# Patient Record
Sex: MALE | Race: WHITE | Employment: OTHER | URBAN - METROPOLITAN AREA
[De-identification: names, ages, dates, MRNs, and addresses within clinical notes are randomized per-mention and may not be internally consistent; named-entity substitution may affect disease eponyms.]

---

## 2017-01-06 DIAGNOSIS — M54.50 CHRONIC LOW BACK PAIN WITHOUT SCIATICA, UNSPECIFIED BACK PAIN LATERALITY: ICD-10-CM

## 2017-01-06 DIAGNOSIS — G89.29 CHRONIC LOW BACK PAIN WITHOUT SCIATICA, UNSPECIFIED BACK PAIN LATERALITY: ICD-10-CM

## 2017-01-06 RX ORDER — HYDROCODONE BITARTRATE AND ACETAMINOPHEN 10; 325 MG/1; MG/1
1 TABLET ORAL
Qty: 120 TAB | Refills: 0 | Status: SHIPPED | OUTPATIENT
Start: 2017-01-06 | End: 2017-02-02 | Stop reason: ALTCHOICE

## 2017-01-06 RX ORDER — CARISOPRODOL 350 MG/1
TABLET ORAL
Qty: 120 TAB | Refills: 0 | Status: SHIPPED | OUTPATIENT
Start: 2017-01-06 | End: 2017-02-02 | Stop reason: SDUPTHER

## 2017-01-06 NOTE — TELEPHONE ENCOUNTER
Reviewed report generated by the [facility]. Does not demonstrate aberrancies or inconsistencies with regard to the prescribing of controlled medications to this patient by other providers.   Last filled by MAGI on 12/7/16

## 2017-01-06 NOTE — TELEPHONE ENCOUNTER
Patient called for   Requested Prescriptions     Pending Prescriptions Disp Refills    carisoprodol (SOMA) 350 mg tablet 120 Tab 0     Sig: TAKE 1 TABLET BY MOUTH FOUR TIMES DAILY    HYDROcodone-acetaminophen (NORCO)  mg tablet 120 Tab 0     Sig: Take 1 Tab by mouth every six (6) hours as needed for Pain. He would like to  both scripts in hardcopy today if possible.

## 2017-02-02 ENCOUNTER — OFFICE VISIT (OUTPATIENT)
Dept: INTERNAL MEDICINE CLINIC | Age: 72
End: 2017-02-02

## 2017-02-02 VITALS
DIASTOLIC BLOOD PRESSURE: 80 MMHG | SYSTOLIC BLOOD PRESSURE: 114 MMHG | TEMPERATURE: 98.7 F | HEART RATE: 72 BPM | WEIGHT: 197 LBS | HEIGHT: 69 IN | OXYGEN SATURATION: 97 % | BODY MASS INDEX: 29.18 KG/M2

## 2017-02-02 DIAGNOSIS — M89.8X9 BONE PAIN: ICD-10-CM

## 2017-02-02 DIAGNOSIS — E55.9 HYPOVITAMINOSIS D: ICD-10-CM

## 2017-02-02 DIAGNOSIS — G89.29 CHRONIC LOW BACK PAIN WITHOUT SCIATICA, UNSPECIFIED BACK PAIN LATERALITY: ICD-10-CM

## 2017-02-02 DIAGNOSIS — M54.12 CERVICAL RADICULOPATHY: ICD-10-CM

## 2017-02-02 DIAGNOSIS — M79.10 MYALGIA: Primary | ICD-10-CM

## 2017-02-02 DIAGNOSIS — M54.50 CHRONIC LOW BACK PAIN WITHOUT SCIATICA, UNSPECIFIED BACK PAIN LATERALITY: ICD-10-CM

## 2017-02-02 RX ORDER — OXYCODONE AND ACETAMINOPHEN 7.5; 325 MG/1; MG/1
1 TABLET ORAL
Qty: 120 TAB | Refills: 0 | Status: SHIPPED | OUTPATIENT
Start: 2017-02-02 | End: 2017-03-01 | Stop reason: SDUPTHER

## 2017-02-02 RX ORDER — CARISOPRODOL 350 MG/1
TABLET ORAL
Qty: 120 TAB | Refills: 0 | Status: SHIPPED | OUTPATIENT
Start: 2017-02-02 | End: 2017-03-02 | Stop reason: SDUPTHER

## 2017-02-02 RX ORDER — HYDROCODONE BITARTRATE AND ACETAMINOPHEN 10; 325 MG/1; MG/1
1 TABLET ORAL
Qty: 120 TAB | Refills: 0 | Status: CANCELLED | OUTPATIENT
Start: 2017-02-02

## 2017-02-02 NOTE — PROGRESS NOTES
80 y.o. WHITE OR  male who presents for f/u    Since we saw him last, Dr Garret Larsen said he was not a candidate for any surgery. He's been using the cymbalta and norco up to 4 a day  w fair response. He had seen Dr No Hays prior to that. He tried his daughter's percocet and that seemed to work better so he's asking to change to that. He reports that the sx he was attributing to statin are back even though he's not taken crestor since before fall. He's thinking of starting it back.   He is interested in doing more testing to figure out why he has pain in the forearms and long bones in the legs outside of the joints also     Past Medical History   Diagnosis Date    Abuse      h/o alcohol abstinent since 1/13    Asbestosis(501) ([facility] [address] 1997     dx'ed in [place], f/u xrays negative    Bilateral hearing loss     Chronic pain      DIRE score 18 from 5/16    Chronic prostatitis      Dr. Amy Norman COPD 7/11     on CXR    DJD (degenerative joint disease)      c spine on mri 2004, t spine mri 10/13; saw MAURICIO Velazco; intol lyrica, topamax, no response to gabapentin    Epididymitis      recurrent Dr Leocadia Meigs 10/14; also gb polyps, no stones    FHx: heart disease     GERD (gastroesophageal reflux disease)      h/o gastritis; s/p dilation 8/14 Dr Juan Rodríguez    Hyperlipidemia      calculated 10 year risk score 12.6% (2014)    Hypovitaminosis D 7/14    Microscopic hematuria     Pancreatitis      10/14 from binge alcohol drinking; on CT 10/14 Obici    PUD (peptic ulcer disease) 8/14     antral ulcers, gastritis, gr 3 esophagitis, esoph ring s/p dilation, neg h pylori Dr Juan Rodríguez    Recurrent kidney stones      2006 Dr. Wes Davis; 2015     Past Surgical History   Procedure Laterality Date    Hx hemorrhoidectomy  2005     Dr. Zahra Euceda Pr cardiac surg procedure unlist  1993     thallium negative    Hx orthopaedic  2006     left knee surgery/meniscus/patella in [place], [place] workmans comp    Hx cataract removal Bilateral 2012    Hx colonoscopy       Dr. Maggy Hyatt 2005 negative    Hx urological  4/15     [facility], Dr. Rita Lockwood, Cystoscopy, left retrograde ureteroscopy, holmium laser lithotripsy, double j catheter      Current Outpatient Prescriptions   Medication Sig    carisoprodol (SOMA) 350 mg tablet TAKE 1 TABLET BY MOUTH FOUR TIMES DAILY    oxyCODONE-acetaminophen (PERCOCET 7.5) 7.5-325 mg per tablet Take 1 Tab by mouth every six (6) hours as needed for Pain. Max Daily Amount: 4 Tabs.  temazepam (RESTORIL) 30 mg capsule TAKE ONE CAPSULE BY MOUTH NIGHTLY AS NEEDED FOR SLEEP    rosuvastatin (CRESTOR) 5 mg tablet Take 1 Tab by mouth nightly. No current facility-administered medications for this visit. Allergies   Allergen Reactions    Benadryl [Diphenhydramine Hcl] Other (comments)     Patient states he becomes hyper      Flexeril [Cyclobenzaprine] Other (comments)     Felt bad      Gabapentin Swelling    Lipitor [Atorvastatin] Myalgia    Lyrica [Pregabalin] Other (comments)     Weight gain    Nexium [Esomeprazole Magnesium] Other (comments)     Dry eyes    Pamelor [Nortriptyline] Hives    Pravastatin Myalgia    Septra [Sulfamethoprim Ds] Other (comments)     Agitation      Sinequan [Doxepin] Other (comments)     Irritable      Topamax [Topiramate] Other (comments)     Possible kidney stones? Visit Vitals    /80    Pulse 72    Temp 98.7 °F (37.1 °C) (Oral)    Ht 5' 9\" (1.753 m)    Wt 197 lb (89.4 kg)    SpO2 97%    BMI 29.09 kg/m2   A&O x3  Affect is appropriate. Mood stable  No apparent distress  Anicteric, no JVD, adenopathy or thyromegaly. No carotid bruits or radiated murmur  Lungs clear to auscultation, no wheezes or rales  Heart showed regular rate and rhythm. No murmur, rubs, gallops  Abdomen soft nontender, no hepatosplenomegaly or masses. Extremities without edema.   Pulses 1-2+ symmetrically  No clear muscle or tendon tenderness, no bony tenderness, no clear synovitis in hands/wrists/elbows    Assessment and plan:  1. Chronic pain. Check ck/abraham, vit d. Check bone scan. Check emg to r/o radiculopathy. Change to percocet. Further recs pending results      Above conditions discussed at length and patient vocalized understanding.   All questions answered to patient satifaction

## 2017-02-02 NOTE — PROGRESS NOTES
1. Have you been to the ER, urgent care clinic or hospitalized since your last visit? NO.     2. Have you seen or consulted any other health care providers outside of the [address] [place] [address] since your last visit (Include any pap smears or colon screening)? NO      Do you have an Advanced Directive? NO    Would you like information on Advanced Directives?  YES

## 2017-02-02 NOTE — MR AVS SNAPSHOT
Visit Information Date & Time Provider Department Dept. Phone Encounter #  
 2/2/2017  3:00 PM Isabelle Mera MD Internist of [address] [place] [address] Road [phone] [id number] Upcoming Health Maintenance Date Due Hepatitis C Screening 1945 DTaP/Tdap/Td series (1 - Tdap) 1/2/2006 GLAUCOMA SCREENING Q2Y 8/4/2011 INFLUENZA AGE 9 TO ADULT 8/1/2016 FOBT Q 1 YEAR AGE 50-75 4/4/2017 MEDICARE YEARLY EXAM 4/5/2017 Pneumococcal 65+ Low/Medium Risk (2 of 2 - PPSV23) 5/27/2017 Allergies as of 2/2/2017  Review Complete On: 8/2/2016 By: Isabelle Mera MD  
  
 Severity Noted Reaction Type Reactions Benadryl [Diphenhydramine Hcl]  04/23/2015    Other (comments) Patient states he becomes hyper Flexeril [Cyclobenzaprine]  10/04/2013    Other (comments) Felt bad  
 Gabapentin  01/13/2014   Systemic Swelling Lipitor [Atorvastatin]  04/04/2016    Myalgia Lyrica [Pregabalin]  03/16/2015    Other (comments) Weight gain Nexium [Esomeprazole Magnesium]    Other (comments) Dry eyes Pamelor [Nortriptyline]    Hives Pravastatin  05/01/2016    Myalgia Septra [Sulfamethoprim Ds]    Other (comments) Agitation Sinequan [Doxepin]    Other (comments) Irritable Topamax [Topiramate]  11/12/2015    Other (comments) Possible kidney stones? Current Immunizations  Reviewed on 5/27/2016 Name Date Influenza Vaccine 10/1/2015, 10/1/2014, 11/1/2013, 10/1/2007 Pneumococcal Conjugate (PCV-13) 5/27/2016  9:36 AM  
 Td 1/1/2006 Zoster Vaccine, Live 4/4/2016 Not reviewed this visit You Were Diagnosed With   
  
 Codes Comments Myalgia    -  Primary ICD-10-CM: M79.1 ICD-9-CM: 729.1 Chronic low back pain without sciatica, unspecified back pain laterality     ICD-10-CM: M54.5, G89.29 ICD-9-CM: 724.2, 338.29 Hypovitaminosis D     ICD-10-CM: E55.9 ICD-9-CM: 268.9 Vitals BP Pulse Temp Height(growth percentile) Weight(growth percentile) SpO2  
 114/80 72 98.7 °F (37.1 °C) (Oral) 5' 9\" (1.753 m) 197 lb (89.4 kg) 97% BMI Smoking Status 29.09 kg/m2 Former Smoker Vitals History BMI and BSA Data Body Mass Index Body Surface Area  
 29.09 kg/m 2 2.09 m 2 [facility] Your Updated Medication List  
  
   
This list is accurate as of: 2/2/17  3:39 PM.  Always use your most recent med list.  
  
  
  
  
 carisoprodol 350 mg tablet Commonly known as:  SOMA TAKE 1 TABLET BY MOUTH FOUR TIMES DAILY  
  
 oxyCODONE-acetaminophen 7.5-325 mg per tablet Commonly known as:  PERCOCET 7.5 Take 1 Tab by mouth every six (6) hours as needed for Pain. Max Daily Amount: 4 Tabs. rosuvastatin 5 mg tablet Commonly known as:  CRESTOR Take 1 Tab by mouth nightly. temazepam 30 mg capsule Commonly known as:  RESTORIL  
TAKE ONE CAPSULE BY MOUTH NIGHTLY AS NEEDED FOR SLEEP Prescriptions Printed Refills  
 carisoprodol (SOMA) 350 mg tablet 0 Sig: TAKE 1 TABLET BY MOUTH FOUR TIMES DAILY Class: Print  
 oxyCODONE-acetaminophen (PERCOCET 7.5) 7.5-325 mg per tablet 0 Sig: Take 1 Tab by mouth every six (6) hours as needed for Pain. Max Daily Amount: 4 Tabs. Class: Print Route: Oral  
  
We Performed the Following ALDOLASE [id number] CPT(R)] CK [id number] CPT(R)] METABOLIC PANEL, COMPREHENSIVE [02205 CPT(R)] VITAMIN D, 25 HYDROXY [id number] CPT(R)] To-Do List   
 02/02/2017 Lab:  ALDOLASE   
  
 02/02/2017 Lab:  CK   
  
 02/02/2017 Lab:  METABOLIC PANEL, COMPREHENSIVE   
  
 02/02/2017 Lab:  VITAMIN D, 25 HYDROXY Introducing \[facility] [place] [facility]\"" & HEALTH SERVICES! Dear Raiza Fabian: Thank you for requesting a Energy account.   Our records indicate that you already have an active Qomuty account. You can access your account anytime at [email or web address] Did you know that you can access your hospital and ER discharge instructions at any time in Qomuty? You can also review all of your test results from your hospital stay or ER visit. Additional Information If you have questions, please visit the Frequently Asked Questions section of the Qomuty website at [email or web address] Remember, Qomuty is NOT to be used for urgent needs. For medical emergencies, dial 911. Now available from your iPhone and Android! Please provide this summary of care documentation to your next provider. Your primary care clinician is listed as Loy Nguyen. If you have any questions after today's visit, please call [phone].

## 2017-02-03 LAB
25(OH)D3 SERPL-MCNC: 31.9 NG/ML (ref 32–100)
A-G RATIO,AGRAT: 1.8 RATIO (ref 1.1–2.6)
ALBUMIN SERPL-MCNC: 4.6 G/DL (ref 3.5–5)
ALP SERPL-CCNC: 91 U/L (ref 40–125)
ALT SERPL-CCNC: 26 U/L (ref 5–40)
ANION GAP SERPL CALC-SCNC: 19 MMOL/L
AST SERPL W P-5'-P-CCNC: 20 U/L (ref 10–37)
BILIRUB SERPL-MCNC: 0.3 MG/DL (ref 0.2–1.2)
BUN SERPL-MCNC: 20 MG/DL (ref 6–22)
CALCIUM SERPL-MCNC: 9.4 MG/DL (ref 8.4–10.4)
CHLORIDE SERPL-SCNC: 97 MMOL/L (ref 98–110)
CK SERPL-CCNC: 183 U/L (ref 30–200)
CO2 SERPL-SCNC: 23 MMOL/L (ref 20–32)
CREAT SERPL-MCNC: 0.7 MG/DL (ref 0.8–1.6)
GFRAA, 66117: >60
GFRNA, 66118: >60
GLOBULIN,GLOB: 2.6 G/DL (ref 2–4)
GLUCOSE SERPL-MCNC: 103 MG/DL (ref 65–99)
POTASSIUM SERPL-SCNC: 5 MMOL/L (ref 3.5–5.5)
PROT SERPL-MCNC: 7.2 G/DL (ref 6.2–8.1)
SODIUM SERPL-SCNC: 139 MMOL/L (ref 133–145)

## 2017-02-04 LAB — ALDOLASE, 002030: 4.2 U/L

## 2017-02-13 ENCOUNTER — TELEPHONE (OUTPATIENT)
Dept: INTERNAL MEDICINE CLINIC | Age: 72
End: 2017-02-13

## 2017-02-13 DIAGNOSIS — R52 ACHES: Primary | ICD-10-CM

## 2017-02-13 NOTE — TELEPHONE ENCOUNTER
pls call    Results for orders placed or performed in visit on 02/02/17   ALDOLASE   Result Value Ref Range    Aldolase 4.2 3.3 - 10.3 U/L   CK   Result Value Ref Range    Creatine Kinase,Total 183 30 - 200 U/L   VITAMIN D, 25 HYDROXY   Result Value Ref Range    VITAMIN D, 25-HYDROXY 31.9 (L) 32.0 - 612.8 ng/mL   METABOLIC PANEL, COMPREHENSIVE   Result Value Ref Range    Glucose 103 (H) 65 - 99 mg/dL    BUN 20 6 - 22 mg/dL    Creatinine 0.7 (L) 0.8 - 1.6 mg/dL    Sodium 139 133 - 145 mmol/L    Potassium 5.0 3.5 - 5.5 mmol/L    Chloride 97 (L) 98 - 110 mmol/L    CO2 23 20 - 32 mmol/L    AST (SGOT) 20 10 - 37 U/L    ALT (SGPT) 26 5 - 40 U/L    Alk.  phosphatase 91 40 - 125 U/L    Bilirubin, total 0.3 0.2 - 1.2 mg/dL    Calcium 9.4 8.4 - 10.4 mg/dL    Protein, total 7.2 6.2 - 8.1 g/dL    Albumin 4.6 3.5 - 5.0 g/dL    A-G Ratio 1.8 1.1 - 2.6 ratio    Globulin 2.6 2.0 - 4.0 g/dL    Anion gap 19.0 mmol/L    GFRAA >60.0 >60.0    GFRNA >60.0 >60.0     Labs ok  sched for sed rate and c reactive protein test looking for inflammation pls

## 2017-03-01 DIAGNOSIS — M54.50 CHRONIC LOW BACK PAIN WITHOUT SCIATICA, UNSPECIFIED BACK PAIN LATERALITY: ICD-10-CM

## 2017-03-01 DIAGNOSIS — G89.29 CHRONIC LOW BACK PAIN WITHOUT SCIATICA, UNSPECIFIED BACK PAIN LATERALITY: ICD-10-CM

## 2017-03-01 RX ORDER — OXYCODONE AND ACETAMINOPHEN 7.5; 325 MG/1; MG/1
1 TABLET ORAL
Qty: 120 TAB | Refills: 0 | Status: SHIPPED | OUTPATIENT
Start: 2017-03-01 | End: 2017-03-31 | Stop reason: ALTCHOICE

## 2017-03-02 DIAGNOSIS — M54.50 CHRONIC LOW BACK PAIN WITHOUT SCIATICA, UNSPECIFIED BACK PAIN LATERALITY: ICD-10-CM

## 2017-03-02 DIAGNOSIS — G89.29 CHRONIC LOW BACK PAIN WITHOUT SCIATICA, UNSPECIFIED BACK PAIN LATERALITY: ICD-10-CM

## 2017-03-02 RX ORDER — CARISOPRODOL 350 MG/1
TABLET ORAL
Qty: 120 TAB | Refills: 0 | Status: SHIPPED | OUTPATIENT
Start: 2017-03-02 | End: 2017-03-29 | Stop reason: SDUPTHER

## 2017-03-29 DIAGNOSIS — G89.29 CHRONIC LOW BACK PAIN WITHOUT SCIATICA, UNSPECIFIED BACK PAIN LATERALITY: ICD-10-CM

## 2017-03-29 DIAGNOSIS — M54.50 CHRONIC LOW BACK PAIN WITHOUT SCIATICA, UNSPECIFIED BACK PAIN LATERALITY: ICD-10-CM

## 2017-03-30 RX ORDER — CARISOPRODOL 350 MG/1
TABLET ORAL
Qty: 120 TAB | Refills: 0 | OUTPATIENT
Start: 2017-03-30 | End: 2017-03-31 | Stop reason: SDUPTHER

## 2017-03-30 RX ORDER — CARISOPRODOL 350 MG/1
TABLET ORAL
Qty: 120 TAB | Refills: 0 | Status: CANCELLED | OUTPATIENT
Start: 2017-03-30

## 2017-03-31 ENCOUNTER — OFFICE VISIT (OUTPATIENT)
Dept: INTERNAL MEDICINE CLINIC | Age: 72
End: 2017-03-31

## 2017-03-31 VITALS
TEMPERATURE: 97.8 F | BODY MASS INDEX: 28.73 KG/M2 | HEART RATE: 78 BPM | OXYGEN SATURATION: 96 % | SYSTOLIC BLOOD PRESSURE: 132 MMHG | DIASTOLIC BLOOD PRESSURE: 72 MMHG | HEIGHT: 69 IN | WEIGHT: 194 LBS

## 2017-03-31 DIAGNOSIS — M79.7 FIBROMYALGIA: ICD-10-CM

## 2017-03-31 DIAGNOSIS — Z12.11 ENCOUNTER FOR SCREENING COLONOSCOPY: ICD-10-CM

## 2017-03-31 DIAGNOSIS — M54.2 CHRONIC NECK PAIN: ICD-10-CM

## 2017-03-31 DIAGNOSIS — K21.9 GASTROESOPHAGEAL REFLUX DISEASE WITHOUT ESOPHAGITIS: ICD-10-CM

## 2017-03-31 DIAGNOSIS — M35.3 POLYMYALGIA (HCC): Primary | ICD-10-CM

## 2017-03-31 DIAGNOSIS — M15.9 PRIMARY OSTEOARTHRITIS INVOLVING MULTIPLE JOINTS: ICD-10-CM

## 2017-03-31 DIAGNOSIS — M54.50 CHRONIC LOW BACK PAIN WITHOUT SCIATICA, UNSPECIFIED BACK PAIN LATERALITY: ICD-10-CM

## 2017-03-31 DIAGNOSIS — G89.29 CHRONIC NECK PAIN: ICD-10-CM

## 2017-03-31 DIAGNOSIS — G89.29 CHRONIC LOW BACK PAIN WITHOUT SCIATICA, UNSPECIFIED BACK PAIN LATERALITY: ICD-10-CM

## 2017-03-31 DIAGNOSIS — E78.5 HYPERLIPIDEMIA, UNSPECIFIED HYPERLIPIDEMIA TYPE: ICD-10-CM

## 2017-03-31 RX ORDER — CARISOPRODOL 350 MG/1
TABLET ORAL
Qty: 120 TAB | Refills: 0 | Status: SHIPPED | OUTPATIENT
Start: 2017-03-31 | End: 2017-04-28 | Stop reason: SDUPTHER

## 2017-03-31 RX ORDER — HYDROCODONE BITARTRATE AND ACETAMINOPHEN 10; 325 MG/1; MG/1
1 TABLET ORAL
Qty: 120 TAB | Refills: 0 | Status: SHIPPED | OUTPATIENT
Start: 2017-03-31 | End: 2017-04-28 | Stop reason: SDUPTHER

## 2017-03-31 NOTE — MR AVS SNAPSHOT
Visit Information Date & Time Provider Department Dept. Phone Encounter #  
 3/31/2017  9:45 AM Shefali Workman MD Internist of [address] [place] [address] Road [phone] [id number] Upcoming Health Maintenance Date Due Hepatitis C Screening 1945 DTaP/Tdap/Td series (1 - Tdap) 1/2/2006 GLAUCOMA SCREENING Q2Y 8/4/2011 FOBT Q 1 YEAR AGE 50-75 4/4/2017 MEDICARE YEARLY EXAM 4/5/2017 Pneumococcal 65+ Low/Medium Risk (2 of 2 - PPSV23) 5/27/2017 Allergies as of 3/31/2017  Review Complete On: 3/31/2017 By: Shefali Workman MD  
  
 Severity Noted Reaction Type Reactions Benadryl [Diphenhydramine Hcl]  04/23/2015    Other (comments) Patient states he becomes hyper Flexeril [Cyclobenzaprine]  10/04/2013    Other (comments) Felt bad  
 Gabapentin  01/13/2014   Systemic Swelling Lipitor [Atorvastatin]  04/04/2016    Myalgia Lyrica [Pregabalin]  03/16/2015    Other (comments) Weight gain Nexium [Esomeprazole Magnesium]    Other (comments) Dry eyes Pamelor [Nortriptyline]    Hives Pravastatin  05/01/2016    Myalgia Septra [Sulfamethoprim Ds]    Other (comments) Agitation Sinequan [Doxepin]    Other (comments) Irritable Topamax [Topiramate]  11/12/2015    Other (comments) Possible kidney stones? Current Immunizations  Reviewed on 5/27/2016 Name Date Influenza Vaccine 10/1/2015, 10/1/2014, 11/1/2013, 10/1/2007 Pneumococcal Conjugate (PCV-13) 5/27/2016  9:36 AM  
 Td 1/1/2006 Zoster Vaccine, Live 4/4/2016 Not reviewed this visit You Were Diagnosed With   
  
 Codes Comments Polymyalgia ([facility] [address]    -  Primary ICD-10-CM: M35.3 ICD-9-CM: 649 Fibromyalgia     ICD-10-CM: M79.7 ICD-9-CM: 729.1 Chronic low back pain without sciatica, unspecified back pain laterality     ICD-10-CM: M54.5, G89.29 ICD-9-CM: 724.2, 338.29 Vitals BP Pulse Temp Height(growth percentile) Weight(growth percentile) SpO2 132/72 78 97.8 °F (36.6 °C) (Oral) 5' 9\" (1.753 m) 194 lb (88 kg) 96% BMI Smoking Status 28.65 kg/m2 Former Smoker Vitals History BMI and BSA Data Body Mass Index Body Surface Area  
 28.65 kg/m 2 2.07 m 2 Preferred Pharmacy Pharm[facility] Your Updated Medication List  
  
   
This list is accurate as of: 3/31/17 10:35 AM.  Always use your most recent med list.  
  
  
  
  
 carisoprodol 350 mg tablet Commonly known as:  SOMA TAKE 1 TABLET BY MOUTH FOUR TIMES DAILY HYDROcodone-acetaminophen  mg tablet Commonly known as:  Lyn Parisian Take 1 Tab by mouth every six (6) hours as needed for Pain. Max Daily Amount: 4 Tabs. rosuvastatin 5 mg tablet Commonly known as:  CRESTOR Take 1 Tab by mouth nightly. temazepam 30 mg capsule Commonly known as:  RESTORIL  
TAKE ONE CAPSULE BY MOUTH NIGHTLY AS NEEDED FOR SLEEP Prescriptions Printed Refills  
 carisoprodol (SOMA) 350 mg tablet 0 Sig: TAKE 1 TABLET BY MOUTH FOUR TIMES DAILY Class: Print HYDROcodone-acetaminophen (NORCO)  mg tablet 0 Sig: Take 1 Tab by mouth every six (6) hours as needed for Pain. Max Daily Amount: 4 Tabs. Class: Print Route: Oral  
  
To-Do List   
 03/31/2017 Lab:  CRP, HIGH SENSITIVITY   
  
 03/31/2017 Lab:  SED RATE (ESR) Introducing [facility]! Dear Isrrael Jennings: Thank you for requesting a Canary Calendar account. Our records indicate that you already have an active Canary Calendar account. You can access your account anytime at [email or web address] Did you know that you can access your hospital and ER discharge instructions at any time in Canary Calendar? You can also review all of your test results from your hospital stay or ER visit. Additional Information If you have questions, please visit the Frequently Asked Questions section of the Myriohart website at [email or web address] Remember, Smash Bucket is NOT to be used for urgent needs. For medical emergencies, dial 911. Now available from your iPhone and Android! Please provide this summary of care documentation to your next provider. Your primary care clinician is listed as Chang Burns. If you have any questions after today's visit, please call [phone].

## 2017-04-01 LAB
C-REACTIVE PROTEIN, CARDIAC, 120768: 14.82 MG/L
C-REACTIVE PROTEIN, CARDIAC, 120768: 14.82 MG/L
SED RATE (ESR): 5 MM/HR (ref 0–20)

## 2017-04-01 NOTE — PROGRESS NOTES
80 y.o. WHITE OR  male who presents for evaluation. He's back asking to get back on norco.  The percocet works better for pain control but he doesn't like how it makes him feel. We have tried several non narcotic products without god response. He has seen Dr Gaye Ramirez who said he was not a surgical candidate and opinion from Dr Bri Tapia concurred.  He is trying to limit to 4 tabs a day but it's just difficult    He is on crestor and has not noted any worsening on that    No cardiovascular complaints or gi issues    Past Medical History:   Diagnosis Date    Abuse     h/o alcohol abstinent since 1/13    Asbestosis(501) ([facility] [address] 1997    dx'ed in [place], f/u xrays negative    Bilateral hearing loss     Chronic pain     DIRE score 18 from 5/16; no response gabapentin/lyrica/topamax/cymbalta; controlled substance agreement in place    Chronic prostatitis     Dr. Luciano Breen COPD 7/11    on CXR    DJD (degenerative joint disease)     c spine on mri 2004, t spine mri 10/13; saw Dr Gaye Ramirez; intol lyrica, topamax and cymbalta; no response to gabapentin    Epididymitis     recurrent Dr Juan Pablo Reid 10/14; also gb polyps, no stones    FHx: heart disease     GERD (gastroesophageal reflux disease)     h/o gastritis; s/p dilation 8/14 Dr Dina Kelly    Hyperlipidemia     calculated 10 year risk score 12.6% (2014)    Hypovitaminosis D 7/14    Microscopic hematuria     Pancreatitis     10/14 from binge alcohol drinking; on CT 10/14 Obici    PUD (peptic ulcer disease) 8/14    antral ulcers, gastritis, gr 3 esophagitis, esoph ring s/p dilation, neg h pylori Dr Dina Kelly    Recurrent kidney stones     2006 Dr. Hillary Rinne; 2015     Past Surgical History:   Procedure Laterality Date    CARDIAC SURG PROCEDURE UNLIST  1993    thallium negative    HX CATARACT REMOVAL Bilateral 2012    HX COLONOSCOPY      Dr. Lisa Lui 2005 negative    HX HEMORRHOIDECTOMY  2005    Dr. Moira William  2006    left knee surgery/meniscus/patella in [place], [place] workmans comp    HX UROLOGICAL  4/15    [facility], Dr. Trev Gifford, Cystoscopy, left retrograde ureteroscopy, holmium laser lithotripsy, double j catheter      Social History     Social History    Marital status:      Spouse name: N/A    Number of children: 10    Years of education: N/A     Occupational History     [employer]      Social History Main Topics    Smoking status: Former Smoker     Packs/day: 1.00     Quit date: 1/1/2011    Smokeless tobacco: Former User    Alcohol use Yes      Comment: socially    Drug use: No    Sexual activity: Not on file     Other Topics Concern    Not on file     Social History Narrative     Current Outpatient Prescriptions   Medication Sig    carisoprodol (SOMA) 350 mg tablet TAKE 1 TABLET BY MOUTH FOUR TIMES DAILY    HYDROcodone-acetaminophen (NORCO)  mg tablet Take 1 Tab by mouth every six (6) hours as needed for Pain. Max Daily Amount: 4 Tabs.  temazepam (RESTORIL) 30 mg capsule TAKE ONE CAPSULE BY MOUTH NIGHTLY AS NEEDED FOR SLEEP    rosuvastatin (CRESTOR) 5 mg tablet Take 1 Tab by mouth nightly. No current facility-administered medications for this visit. Allergies   Allergen Reactions    Benadryl [Diphenhydramine Hcl] Other (comments)     Patient states he becomes hyper      Cymbalta [Duloxetine] Other (comments)    Flexeril [Cyclobenzaprine] Other (comments)     Felt bad      Gabapentin Swelling    Lipitor [Atorvastatin] Myalgia    Lyrica [Pregabalin] Other (comments)     Weight gain    Nexium [Esomeprazole Magnesium] Other (comments)     Dry eyes    Pamelor [Nortriptyline] Hives    Pravastatin Myalgia    Septra [Sulfamethoprim Ds] Other (comments)     Agitation      Sinequan [Doxepin] Other (comments)     Irritable      Topamax [Topiramate] Other (comments)     Possible kidney stones?      REVIEW OF SYSTEMS: colo Dr Joslyn Ormond 2005  Ophtho - no vision change or eye pain  Oral - no mouth pain, tongue or tooth problems  Ears - no hearing loss, ear pain, fullness, no swallowing problems  Cardiac - no CP, PND, orthopnea, edema, palpitations or syncope  Resp - no wheezing, chronic coughing, dyspnea  GI - no heartburn, nausea, vomiting, change in bowel habits, bleeding, hemorrhoids  Urinary - no dysuria, hematuria, flank pain, urgency, frequency  Genitals - no genital lesions, discharge, masses, ulceration, warts    Visit Vitals    /72    Pulse 78    Temp 97.8 °F (36.6 °C) (Oral)    Ht 5' 9\" (1.753 m)    Wt 194 lb (88 kg)    SpO2 96%    BMI 28.65 kg/m2   A&O x3  Affect is appropriate. Mood stable  No apparent distress  Anicteric, no JVD, adenopathy or thyromegaly. No carotid bruits or radiated murmur  Lungs clear to auscultation, no wheezes or rales  Heart showed regular rate and rhythm. No murmur, rubs, gallops  Abdomen soft nontender, no hepatosplenomegaly or masses. Extremities without edema.   Pulses 1-2+ symmetrically    LABS  From 7/11 showed   gluc 99,   cr 0.90, gfr>60,                  wbc 5.2, hb 14.1, plt 225  From 7/14 showed   gluc 92,   cr 0.57, gfr 106, alt 31,   chol 274, tg 186, hdl 68, ldl-c 169, wbc 3.9, hb 14.4, plt 241, ua neg, vit d 21.8, tsh 1.46  From 10/14 showed gluc 92,   cr 0.50, gfr>60,  alt 53,                 wbc 7.3, hb 12.4, plt 147, lip 126, ast 69  From 1/15 showed   gluc 99,   cr 0.60, gfr>60,  alt 102, chol 196, tg 187, hdl 60, ldl-c 98,            ast 47, ap 96, tb 0.3  From 3/15 showed   gluc 117, cr 1.30, gfr 53,   alt 36  From 4/15 showed   gluc 124, cr 1.06, gfr>60,  alt 41,      wbc 5.3, hb 15.3, plt 264  From 2/16 showed                   vit d 32.5, ck 113, abraham 8.6  From 2/17 showed   gluc 103, cr 0.70, gfr>60,  alt 26    Patient Active Problem List   Diagnosis Code    GERD and gastritis K21.9    Hyperlipidemia E78.5    Recurrent kidney stones Dr Franny Marte N20.0    Hypovitaminosis D E55.9    Chronic back pain Dr Romy Smalls M54.9, A02.05   [facility] Arthritis, degenerative M19.90    Chronic neck pain M54.2, G89.29     Assessment and plan:  1. Chronic pain. Will sched w pain mgmt. Long discussion about controlled substance agreement, all questions answered and he signed it. Check esr and crp for completeness  2. GERD. PPI and avoidance measures prn  3. Hyperlipidemia. Continue current   4. Nephrolithiasis. F/U Dr Dahiana Mays  5. Hypovit d.  continue replacement  6. Gastritis and PUD. PPI and avoidance measures, no nsaid  7. Prostate. F/U Dr Dahiana Mays        RTC 6/17    Above conditions discussed at length and patient vocalized understanding.   All questions answered to patient satifaction

## 2017-04-01 NOTE — PROGRESS NOTES
pls get records dr Alison Graf    Call pt to sched ov w labs in June- front staff    Call pt to sched colo as he's due - nurse

## 2017-04-04 NOTE — PROGRESS NOTES
Referral was faxed to [facility] today and they will contact pt to schedule colonoscopy.

## 2017-04-17 ENCOUNTER — OFFICE VISIT (OUTPATIENT)
Dept: INTERNAL MEDICINE CLINIC | Age: 72
End: 2017-04-17

## 2017-04-17 VITALS
HEIGHT: 69 IN | SYSTOLIC BLOOD PRESSURE: 138 MMHG | HEART RATE: 80 BPM | OXYGEN SATURATION: 96 % | DIASTOLIC BLOOD PRESSURE: 80 MMHG | BODY MASS INDEX: 27.99 KG/M2 | WEIGHT: 189 LBS | TEMPERATURE: 98.1 F

## 2017-04-17 DIAGNOSIS — M54.2 CHRONIC NECK PAIN: Primary | ICD-10-CM

## 2017-04-17 DIAGNOSIS — G89.29 CHRONIC NECK PAIN: Primary | ICD-10-CM

## 2017-04-17 DIAGNOSIS — G89.29 CHRONIC LOW BACK PAIN WITH SCIATICA, SCIATICA LATERALITY UNSPECIFIED, UNSPECIFIED BACK PAIN LATERALITY: ICD-10-CM

## 2017-04-17 DIAGNOSIS — M54.40 CHRONIC LOW BACK PAIN WITH SCIATICA, SCIATICA LATERALITY UNSPECIFIED, UNSPECIFIED BACK PAIN LATERALITY: ICD-10-CM

## 2017-04-17 NOTE — PROGRESS NOTES
1. Have you been to the ER, urgent care clinic or hospitalized since your last visit? NO.     2. Have you seen or consulted any other health care providers outside of the [address] [place] [address] since your last visit (Include any pap smears or colon screening)? NO      Do you have an Advanced Directive? NO    Would you like information on Advanced Directives?  YES

## 2017-04-17 NOTE — MR AVS SNAPSHOT
Visit Information Date & Time Provider Department Dept. Phone Encounter #  
 4/17/2017 12:15 PM Karin Alpers, MD Internist of [address] [id number] Upcoming Health Maintenance Date Due Hepatitis C Screening 1945 FOBT Q 1 YEAR AGE 50-75 4/4/2017 MEDICARE YEARLY EXAM 4/5/2017 Pneumococcal 65+ Low/Medium Risk (2 of 2 - PPSV23) 5/27/2017 GLAUCOMA SCREENING Q2Y 3/31/2019 DTaP/Tdap/Td series (2 - Td) 3/31/2027 Allergies as of 4/17/2017  Review Complete On: 3/31/2017 By: Karin Alpers, MD  
  
 Severity Noted Reaction Type Reactions Benadryl [Diphenhydramine Hcl]  04/23/2015    Other (comments) Patient states he becomes hyper Cymbalta [Duloxetine]  03/31/2017    Other (comments) Flexeril [Cyclobenzaprine]  10/04/2013    Other (comments) Felt bad  
 Gabapentin  01/13/2014   Systemic Swelling Lipitor [Atorvastatin]  04/04/2016    Myalgia Lyrica [Pregabalin]  03/16/2015    Other (comments) Weight gain Nexium [Esomeprazole Magnesium]    Other (comments) Dry eyes Pamelor [Nortriptyline]    Hives Pravastatin  05/01/2016    Myalgia Septra [Sulfamethoprim Ds]    Other (comments) Agitation Sinequan [Doxepin]    Other (comments) Irritable Topamax [Topiramate]  11/12/2015    Other (comments) Possible kidney stones? Current Immunizations  Reviewed on 5/27/2016 Name Date Influenza Vaccine 10/1/2015, 10/1/2014, 11/1/2013, 10/1/2007 Pneumococcal Conjugate (PCV-13) 5/27/2016  9:36 AM  
 Td 1/1/2006 Zoster Vaccine, Live 4/4/2016 Not reviewed this visit Vitals BP Pulse Temp Height(growth percentile) Weight(growth percentile) SpO2  
 138/80 80 98.1 °F (36.7 °C) (Oral) 5' 9\" (1.753 m) 189 lb (85.7 kg) 96% BMI Smoking Status 27.91 kg/m2 Former Smoker Vitals History BMI and BSA Data  Body Mass Index Body Surface Area  
 27.91 kg/m 2 2.04 m 2  
  
  
 Preferred Pharmacy Pharmacy Name Phone Teddy Villalobos [facility] [facility] Your Updated Medication List  
  
   
This list is accurate as of: 4/17/17  1:10 PM.  Always use your most recent med list.  
  
  
  
  
 carisoprodol 350 mg tablet Commonly known as:  SOMA TAKE 1 TABLET BY MOUTH FOUR TIMES DAILY HYDROcodone-acetaminophen  mg tablet Commonly known as:  Fredick Sly Take 1 Tab by mouth every six (6) hours as needed for Pain. Max Daily Amount: 4 Tabs. rosuvastatin 5 mg tablet Commonly known as:  CRESTOR Take 1 Tab by mouth nightly. temazepam 30 mg capsule Commonly known as:  RESTORIL  
TAKE ONE CAPSULE BY MOUTH NIGHTLY AS NEEDED FOR SLEEP Introducing [facility] SERVICES! Dear Raúl Lyle: Thank you for requesting a [employer] account. Our records indicate that you already have an active [employer] account. You can access your account anytime at [email or web address] Did you know that you can access your hospital and ER discharge instructions at any time in [employer] You can also review all of your test results from your hospital stay or ER visit. Additional Information If you have questions, please visit the Frequently Asked Questions section of the [employer] website at [email or web address] Remember, [employer] is NOT to be used for urgent needs. For medical emergencies, dial 911. Now available from your iPhone and Android! Please provide this summary of care documentation to your next provider. Your primary care clinician is listed as Nolberto Long. If you have any questions after today's visit, please call [phone].

## 2017-04-28 DIAGNOSIS — G89.29 CHRONIC LOW BACK PAIN WITHOUT SCIATICA, UNSPECIFIED BACK PAIN LATERALITY: ICD-10-CM

## 2017-04-28 DIAGNOSIS — M54.50 CHRONIC LOW BACK PAIN WITHOUT SCIATICA, UNSPECIFIED BACK PAIN LATERALITY: ICD-10-CM

## 2017-04-28 RX ORDER — CARISOPRODOL 350 MG/1
TABLET ORAL
Qty: 120 TAB | Refills: 0 | Status: SHIPPED | OUTPATIENT
Start: 2017-04-28 | End: 2017-05-30 | Stop reason: SDUPTHER

## 2017-04-28 RX ORDER — HYDROCODONE BITARTRATE AND ACETAMINOPHEN 10; 325 MG/1; MG/1
1 TABLET ORAL
Qty: 120 TAB | Refills: 0 | Status: SHIPPED | OUTPATIENT
Start: 2017-04-28 | End: 2017-05-30 | Stop reason: SDUPTHER

## 2017-04-28 NOTE — TELEPHONE ENCOUNTER
Last filled both 3/31/17  Reviewed report generated by the [facility]. Does not demonstrate aberrancies or inconsistencies with regard to the prescribing of controlled medications to this patient by other providers.

## 2017-05-25 ENCOUNTER — OFFICE VISIT (OUTPATIENT)
Dept: INTERNAL MEDICINE CLINIC | Age: 72
End: 2017-05-25

## 2017-05-25 VITALS
SYSTOLIC BLOOD PRESSURE: 138 MMHG | DIASTOLIC BLOOD PRESSURE: 80 MMHG | HEIGHT: 69 IN | WEIGHT: 198 LBS | OXYGEN SATURATION: 98 % | TEMPERATURE: 98 F | HEART RATE: 78 BPM | BODY MASS INDEX: 29.33 KG/M2

## 2017-05-25 DIAGNOSIS — M15.9 PRIMARY OSTEOARTHRITIS INVOLVING MULTIPLE JOINTS: ICD-10-CM

## 2017-05-25 DIAGNOSIS — G89.29 CHRONIC NECK PAIN: Primary | ICD-10-CM

## 2017-05-25 DIAGNOSIS — M54.40 CHRONIC LOW BACK PAIN WITH SCIATICA, SCIATICA LATERALITY UNSPECIFIED, UNSPECIFIED BACK PAIN LATERALITY: ICD-10-CM

## 2017-05-25 DIAGNOSIS — G89.29 CHRONIC LOW BACK PAIN WITH SCIATICA, SCIATICA LATERALITY UNSPECIFIED, UNSPECIFIED BACK PAIN LATERALITY: ICD-10-CM

## 2017-05-25 DIAGNOSIS — M54.2 CHRONIC NECK PAIN: Primary | ICD-10-CM

## 2017-05-25 NOTE — MR AVS SNAPSHOT
Visit Information Date & Time Provider Department Dept. Phone Encounter #  
 5/25/2017  3:15 PM Janny Jean MD Internist of [address] [id number] Upcoming Health Maintenance Date Due Hepatitis C Screening 1945 FOBT Q 1 YEAR AGE 50-75 4/4/2017 MEDICARE YEARLY EXAM 4/5/2017 Pneumococcal 65+ Low/Medium Risk (2 of 2 - PPSV23) 5/27/2017 INFLUENZA AGE 9 TO ADULT 8/1/2017 GLAUCOMA SCREENING Q2Y 3/31/2019 DTaP/Tdap/Td series (2 - Td) 3/31/2027 Allergies as of 5/25/2017  Review Complete On: 4/17/2017 By: Janny Jean MD  
  
 Severity Noted Reaction Type Reactions Benadryl [Diphenhydramine Hcl]  04/23/2015    Other (comments) Patient states he becomes hyper Cymbalta [Duloxetine]  03/31/2017    Other (comments) Flexeril [Cyclobenzaprine]  10/04/2013    Other (comments) Felt bad  
 Gabapentin  01/13/2014   Systemic Swelling Lipitor [Atorvastatin]  04/04/2016    Myalgia Lyrica [Pregabalin]  03/16/2015    Other (comments) Weight gain Nexium [Esomeprazole Magnesium]    Other (comments) Dry eyes Pamelor [Nortriptyline]    Hives Pravastatin  05/01/2016    Myalgia Septra [Sulfamethoprim Ds]    Other (comments) Agitation Sinequan [Doxepin]    Other (comments) Irritable Topamax [Topiramate]  11/12/2015    Other (comments) Possible kidney stones? Current Immunizations  Reviewed on 5/27/2016 Name Date Influenza Vaccine 10/1/2015, 10/1/2014, 11/1/2013, 10/1/2007 Pneumococcal Conjugate (PCV-13) 5/27/2016  9:36 AM  
 Td 1/1/2006 Zoster Vaccine, Live 4/4/2016 Not reviewed this visit Vitals BP Pulse Temp Height(growth percentile) Weight(growth percentile) SpO2  
 148/80 78 98 °F (36.7 °C) (Oral) 5' 9\" (1.753 m) 198 lb (89.8 kg) 98% BMI Smoking Status 29.24 kg/m2 Former Smoker Vitals History BMI and BSA Data Body Mass Index Body Surface Area 29.24 kg/m 2 2.09 m 2 Preferred Pharmacy Pharm[facility] Your Updated Medication List  
  
   
This list is accurate as of: 5/25/17  4:17 PM.  Always use your most recent med list.  
  
  
  
  
 carisoprodol 350 mg tablet Commonly known as:  SOMA TAKE 1 TABLET BY MOUTH FOUR TIMES DAILY HYDROcodone-acetaminophen  mg tablet Commonly known as:  Wilfredo Manish Take 1 Tab by mouth every six (6) hours as needed for Pain. Max Daily Amount: 4 Tabs. rosuvastatin 5 mg tablet Commonly known as:  CRESTOR Take 1 Tab by mouth nightly. temazepam 30 mg capsule Commonly known as:  RESTORIL  
TAKE ONE CAPSULE BY MOUTH NIGHTLY AS NEEDED FOR SLEEP Introducing [facility] SERVICES! Dear Ramandeep Alexis: Thank you for requesting a [employer] account. Our records indicate that you already have an active [employer] account. You can access your account anytime at [email or web address] SpeedTax/[employer] Did you know that you can access your hospital and ER discharge instructions at any time in [employer]? You can also review all of your test results from your hospital stay or ER visit. Additional Information If you have questions, please visit the Frequently Asked Questions section of the [employer] website at [email or web address] Remember, [employer] is NOT to be used for urgent needs. For medical emergencies, dial 911. Now available from your iPhone and Android! Please provide this summary of care documentation to your next provider. Your primary care clinician is listed as Amy Martini. If you have any questions after today's visit, please call [phone].

## 2017-05-25 NOTE — PROGRESS NOTES
80 y.o. WHITE OR  male who presents for evaluation. He is here to discuss the plans regarding his pain medications. He will be going to [place], [place] for a four-month contract and will be visiting his mother who lives a couple hours away He has been getting monthly prescriptions for [place] and Soma for his chronic spine condition.  review has shown appropriate usage. Past Medical History:   Diagnosis Date    Abuse     h/o alcohol abstinent since 1/13    Asbestosis [facility]) 1997    dx'ed in [place], f/u xrays negative    Bilateral hearing loss     Cervical spinal stenosis 08/2016    Dt Ruth; severe C5-6 on mri    Chronic pain     DIRE score 18 from 5/16; no response gabapentin/lyrica/topamax/cymbalta; controlled substance agreement in place    Chronic prostatitis     Dr. Rory Brandt COPD 7/11    on CXR    DJD (degenerative joint disease)     c spine on mri 2004, t spine mri 10/13; saw Dr Galo; intol lyrica, topamax and cymbalta; no response to gabapentin    Epididymitis     recurrent Dr Hadley Faster 10/14; also gb polyps, no stones    FHx: heart disease     GERD (gastroesophageal reflux disease)     h/o gastritis; s/p dilation 8/14 Dr Mayra Mcdaniel    Hyperlipidemia     calculated 10 year risk score 12.6% (2014)    Hypovitaminosis D 7/14    Microscopic hematuria     Pancreatitis     10/14 from binge alcohol drinking; on CT 10/14 Obici    PUD (peptic ulcer disease) 8/14    antral ulcers, gastritis, gr 3 esophagitis, esoph ring s/p dilation, neg h pylori Dr Mayra Mcdaniel    Recurrent kidney stones     2006 Dr. Albert Munoz; 2015     Current Outpatient Prescriptions   Medication Sig    carisoprodol (SOMA) 350 mg tablet TAKE 1 TABLET BY MOUTH FOUR TIMES DAILY    HYDROcodone-acetaminophen (NORCO)  mg tablet Take 1 Tab by mouth every six (6) hours as needed for Pain. Max Daily Amount: 4 Tabs.     temazepam (RESTORIL) 30 mg capsule TAKE ONE CAPSULE BY MOUTH NIGHTLY AS NEEDED FOR SLEEP  rosuvastatin (CRESTOR) 5 mg tablet Take 1 Tab by mouth nightly. No current facility-administered medications for this visit. Allergies   Allergen Reactions    Benadryl [Diphenhydramine Hcl] Other (comments)     Patient states he becomes hyper      Cymbalta [Duloxetine] Other (comments)    Flexeril [Cyclobenzaprine] Other (comments)     Felt bad      Gabapentin Swelling    Lipitor [Atorvastatin] Myalgia    Lyrica [Pregabalin] Other (comments)     Weight gain    Nexium [Esomeprazole Magnesium] Other (comments)     Dry eyes    Pamelor [Nortriptyline] Hives    Pravastatin Myalgia    Septra [Sulfamethoprim Ds] Other (comments)     Agitation      Sinequan [Doxepin] Other (comments)     Irritable      Topamax [Topiramate] Other (comments)     Possible kidney stones? Visit Vitals    /80    Pulse 78    Temp 98 °F (36.7 °C) (Oral)    Ht 5' 9\" (1.753 m)    Wt 198 lb (89.8 kg)    SpO2 98%    BMI 29.24 kg/m2     No exam was done    Assessment and plan:  1. Chronic pain syndrome. We discussed him getting a [address] Box mailing address in [facility] and we will mail the prescriptions there on a timely basis. Above conditions discussed at length and patient vocalized understanding.   All questions answered to patient satifaction

## 2017-05-25 NOTE — PROGRESS NOTES
1. Have you been to the ER, urgent care clinic or hospitalized since your last visit? NO.     2. Have you seen or consulted any other health care providers outside of the [address] [place] [address] since your last visit (Include any pap smears or colon screening)? NO      Do you have an Advanced Directive? NO    Would you like information on Advanced Directives?  YES

## 2017-05-30 DIAGNOSIS — G89.29 CHRONIC LOW BACK PAIN WITHOUT SCIATICA, UNSPECIFIED BACK PAIN LATERALITY: ICD-10-CM

## 2017-05-30 DIAGNOSIS — M54.50 CHRONIC LOW BACK PAIN WITHOUT SCIATICA, UNSPECIFIED BACK PAIN LATERALITY: ICD-10-CM

## 2017-05-30 DIAGNOSIS — G47.00 INSOMNIA, UNSPECIFIED TYPE: ICD-10-CM

## 2017-05-30 RX ORDER — CARISOPRODOL 350 MG/1
TABLET ORAL
Qty: 120 TAB | Refills: 0 | Status: SHIPPED | OUTPATIENT
Start: 2017-05-30 | End: 2017-06-28 | Stop reason: SDUPTHER

## 2017-05-30 RX ORDER — HYDROCODONE BITARTRATE AND ACETAMINOPHEN 10; 325 MG/1; MG/1
1 TABLET ORAL
Qty: 120 TAB | Refills: 0 | Status: SHIPPED | OUTPATIENT
Start: 2017-05-30 | End: 2017-06-28 | Stop reason: SDUPTHER

## 2017-05-30 RX ORDER — TEMAZEPAM 30 MG/1
CAPSULE ORAL
Qty: 30 CAP | Refills: 5 | Status: SHIPPED | OUTPATIENT
Start: 2017-05-30 | End: 2019-03-11

## 2017-06-28 ENCOUNTER — OFFICE VISIT (OUTPATIENT)
Dept: INTERNAL MEDICINE CLINIC | Age: 72
End: 2017-06-28

## 2017-06-28 VITALS
WEIGHT: 198.4 LBS | SYSTOLIC BLOOD PRESSURE: 150 MMHG | TEMPERATURE: 98.2 F | OXYGEN SATURATION: 96 % | BODY MASS INDEX: 29.38 KG/M2 | DIASTOLIC BLOOD PRESSURE: 90 MMHG | HEART RATE: 76 BPM | HEIGHT: 69 IN | RESPIRATION RATE: 14 BRPM

## 2017-06-28 DIAGNOSIS — M54.50 CHRONIC LOW BACK PAIN WITHOUT SCIATICA, UNSPECIFIED BACK PAIN LATERALITY: ICD-10-CM

## 2017-06-28 DIAGNOSIS — G89.29 CHRONIC LOW BACK PAIN WITHOUT SCIATICA, UNSPECIFIED BACK PAIN LATERALITY: ICD-10-CM

## 2017-06-28 RX ORDER — CARISOPRODOL 350 MG/1
TABLET ORAL
Qty: 120 TAB | Refills: 0 | Status: SHIPPED | OUTPATIENT
Start: 2017-06-28 | End: 2018-02-27

## 2017-06-28 RX ORDER — HYDROCODONE BITARTRATE AND ACETAMINOPHEN 10; 325 MG/1; MG/1
1 TABLET ORAL
Qty: 120 TAB | Refills: 0 | Status: SHIPPED | OUTPATIENT
Start: 2017-06-28 | End: 2018-02-27

## 2017-06-28 NOTE — PROGRESS NOTES
1. Have you been to the ER, urgent care clinic or hospitalized since your last visit? NO.     2. Have you seen or consulted any other health care providers outside of the [address] [place] [address] since your last visit (Include any pap smears or colon screening)? NO      Do you have an Advanced Directive? NO    Would you like information on Advanced Directives?  NO

## 2017-06-28 NOTE — MR AVS SNAPSHOT
Visit Information Date & Time Provider Department Dept. Phone Encounter #  
 6/28/2017  9:00 AM Mana Martinez MD Internist of [address] [phone] [id number] Your Appointments 7/14/2017  1:00 PM  
Office Visit with [facility] EDUC CLASS [address] for Pain Management (FABRICIO SCHEDULING) Appt Note: EDUCATION CLASS -  [id number]  
 [address] [place] [address]  
[phone] [facility] [phone] Upcoming Health Maintenance Date Due Hepatitis C Screening 1945 COLONOSCOPY 12/1/2015 MEDICARE YEARLY EXAM 4/5/2017 Pneumococcal 65+ Low/Medium Risk (2 of 2 - PPSV23) 5/27/2017 INFLUENZA AGE 9 TO ADULT 8/1/2017 GLAUCOMA SCREENING Q2Y 3/31/2019 DTaP/Tdap/Td series (2 - Td) 3/31/2027 Allergies as of 6/28/2017  Review Complete On: 6/28/2017 By: Ramon Last Severity Noted Reaction Type Reactions Benadryl [Diphenhydramine Hcl]  04/23/2015    Other (comments) Patient states he becomes hyper Cymbalta [Duloxetine]  03/31/2017    Other (comments) Flexeril [Cyclobenzaprine]  10/04/2013    Other (comments) Felt bad  
 Gabapentin  01/13/2014   Systemic Swelling Lipitor [Atorvastatin]  04/04/2016    Myalgia Lyrica [Pregabalin]  03/16/2015    Other (comments) Weight gain Nexium [Esomeprazole Magnesium]    Other (comments) Dry eyes Pamelor [Nortriptyline]    Hives Pravastatin  05/01/2016    Myalgia Septra [Sulfamethoprim Ds]    Other (comments) Agitation Sinequan [Doxepin]    Other (comments) Irritable Topamax [Topiramate]  11/12/2015    Other (comments) Possible kidney stones? Current Immunizations  Reviewed on 5/26/2017 Name Date Influenza Vaccine 10/1/2016, 10/1/2015, 10/1/2014, 11/1/2013, 10/1/2007 Pneumococcal Conjugate (PCV-13) 5/27/2016  9:36 AM  
 Td 1/1/2006 Zoster Vaccine, Live 4/4/2016 Not reviewed this visit You Were Diagnosed With   
  
 Codes Comments Chronic low back pain without sciatica, unspecified back pain laterality     ICD-10-CM: M54.5, G89.29 ICD-9-CM: 724.2, 338.29 Vitals BP Pulse Temp Resp Height(growth percentile) Weight(growth percentile) 150/90 (BP 1 Location: Left arm, BP Patient Position: Sitting) 76 98.2 °F (36.8 °C) (Oral) 14 5' 9\" (1.753 m) 198 lb 6.4 oz (90 kg) SpO2 BMI Smoking Status 96% 29.3 kg/m2 Former Smoker Vitals History BMI and BSA Data Body Mass Index Body Surface Area  
 29.3 kg/m 2 2.09 m 2 Preferred Pharmacy Pharmacy Name Phone Aretha Aldridge, [address] Your Updated Medication List  
  
   
This list is accurate as of: 6/28/17 10:03 AM.  Always use your most recent med list.  
  
  
  
  
 carisoprodol 350 mg tablet Commonly known as:  SOMA TAKE 1 TABLET BY MOUTH FOUR TIMES DAILY HYDROcodone-acetaminophen  mg tablet Commonly known as:  Radhika Boot Take 1 Tab by mouth every six (6) hours as needed for Pain. Max Daily Amount: 4 Tabs. rosuvastatin 5 mg tablet Commonly known as:  CRESTOR Take 1 Tab by mouth nightly. temazepam 30 mg capsule Commonly known as:  RESTORIL  
TAKE ONE CAPSULE BY MOUTH NIGHTLY AS NEEDED FOR SLEEP Prescriptions Printed Refills HYDROcodone-acetaminophen (NORCO)  mg tablet 0 Sig: Take 1 Tab by mouth every six (6) hours as needed for Pain. Max Daily Amount: 4 Tabs. Class: Print Route: Oral  
 carisoprodol (SOMA) 350 mg tablet 0 Sig: TAKE 1 TABLET BY MOUTH FOUR TIMES DAILY Class: Print Introducing [facility] SERVICES! Dear Hank Right: Thank you for requesting a [employer] account. Our records indicate that you already have an active [employer] account. You can access your account anytime at [email or web address] Did you know that you can access your hospital and ER discharge instructions at any time in [employer]? You can also review all of your test results from your hospital stay or ER visit. Additional Information If you have questions, please visit the Frequently Asked Questions section of the [employer] website at [email or web address] Remember, [employer] is NOT to be used for urgent needs. For medical emergencies, dial 911. Now available from your iPhone and Android! Please provide this summary of care documentation to your next provider. Your primary care clinician is listed as Marixa Henry. If you have any questions after today's visit, please call [phone].

## 2017-06-29 LAB
AMPHETAMINE SCREEN, URINE: ABNORMAL
BARBITURATE, 796665: ABNORMAL
BENZODIAZEPINES, 714830: ABNORMAL
CANNABINOIDS, 796667: ABNORMAL
COCAINE (METABOLITE), 796668: ABNORMAL
OPIATES, 714864: ABNORMAL
PH, DRUG SCREEN, DSPH: 6 (ref 4.5–8)
SP GR UR REFRACTOMETRY: 1.02 (ref 1–1.03)

## 2017-07-02 NOTE — PROGRESS NOTES
80 y.o. WHITE OR  male who presents for evaluation. At the last visit, we had talked about a proposed trip to [facility] for job contract. That has since been canceled by his company. However, he hd to do so much extra work and overtime that he overdid things and strained his neck, back arms, and had to use more than his alloted number of pills so needs an early refill. Unfortunately, he reports having used THC product because his sister convinced him it would help with his pain. He signed the CSA within the last couple of visits. He is unable to use nsaid due to prior PUD.   He is supposed to see pain clinic in the next month or so    Past Medical History:   Diagnosis Date    Abuse     h/o alcohol abstinent since 1/13    Asbestosis [facility]) 1997    dx'ed in [place], f/u xrays negative    Bilateral hearing loss     Cervical spinal stenosis 08/2016    Dt Ruth; severe C5-6 on mri    Chronic pain     DIRE score 18 from 5/16; no response gabapentin/lyrica/topamax/cymbalta; controlled substance agreement in place    Chronic prostatitis     Dr. Mcginnis Bias COPD 7/11    on CXR    DJD (degenerative joint disease)     c spine on mri 2004, t spine mri 10/13; saw Dr Rtio Paiz; intol lyrica, topamax and cymbalta; no response to gabapentin    Epididymitis     recurrent Dr Kurtis Parada 10/14; also gb polyps, no stones    FHx: heart disease     GERD (gastroesophageal reflux disease)     h/o gastritis; s/p dilation 8/14 Dr Leonila Gilbert    Hyperlipidemia     calculated 10 year risk score 12.6% (2014)    Hypovitaminosis D 7/14    Microscopic hematuria     Pancreatitis     10/14 from binge alcohol drinking; on CT 10/14 Obici    PUD (peptic ulcer disease) 8/14    antral ulcers, gastritis, gr 3 esophagitis, esoph ring s/p dilation, neg h pylori Dr Leonila Gilbert    Recurrent kidney stones     2006 Dr. Bienvenido Duff; 2015     Past Surgical History:   Pro[facility] Later[facility]    [facility]    HX CATARACT REMOVAL Bilateral 2012    HX COLONOSCOPY      Dr. Jacquelyn Romero 2005 negative    HX HEMORRHOIDECTOMY  2005    Dr. Hilary Palmer HX ORTHOPAEDIC  2006    left knee surgery/meniscus/patella in [place], [place] workmans comp    HX UROLOGICAL  4/15    [facility], Dr. Eh Johns, Cystoscopy, left retrograde ureteroscopy, holmium laser lithotripsy, double j catheter      Current Outpatient Prescriptions   Medication Sig    HYDROcodone-acetaminophen (NORCO)  mg tablet Take 1 Tab by mouth every six (6) hours as needed for Pain. Max Daily Amount: 4 Tabs.  carisoprodol (SOMA) 350 mg tablet TAKE 1 TABLET BY MOUTH FOUR TIMES DAILY    temazepam (RESTORIL) 30 mg capsule TAKE ONE CAPSULE BY MOUTH NIGHTLY AS NEEDED FOR SLEEP    rosuvastatin (CRESTOR) 5 mg tablet Take 1 Tab by mouth nightly. No current facility-administered medications for this visit. Allergies   Allergen Reactions    Benadryl [Diphenhydramine Hcl] Other (comments)     Patient states he becomes hyper      Cymbalta [Duloxetine] Other (comments)    Flexeril [Cyclobenzaprine] Other (comments)     Felt bad      Gabapentin Swelling    Lipitor [Atorvastatin] Myalgia    Lyrica [Pregabalin] Other (comments)     Weight gain    Nexium [Esomeprazole Magnesium] Other (comments)     Dry eyes    Pamelor [Nortriptyline] Hives    Pravastatin Myalgia    Septra [Sulfamethoprim Ds] Other (comments)     Agitation      Sinequan [Doxepin] Other (comments)     Irritable      Topamax [Topiramate] Other (comments)     Possible kidney stones? Visit Vitals    /90 (BP 1 Location: Left arm, BP Patient Position: Sitting)    Pulse 76    Temp 98.2 °F (36.8 °C) (Oral)    Resp 14    Ht 5' 9\" (1.753 m)    Wt 198 lb 6.4 oz (90 kg)    SpO2 96%    BMI 29.3 kg/m2   A&O x3  Affect is appropriate. Mood stable  No apparent distress  Anicteric, no JVD, adenopathy or thyromegaly.    No carotid bruits or radiated murmur  Lungs clear to auscultation, no wheezes or rales  Heart showed regular rate and rhythm. No murmur, rubs, gallops  Abdomen soft nontender, no hepatosplenomegaly or masses. Extremities without edema. Pulses 1-2+ symmetrically    Results for orders placed or performed in visit on 06/28/17   DRUG SCREEN URINE 6   Result Value Ref Range    Amphetamine Screen, Urine NDET NDET    Barbiturates NDET NDET    Benzodiazepines NDET NDET    Cannabinoids DET (A) NDET    Cocaine metabolites NDET NDET    Opiates DET (A) NDET    pH, Drug screen 6.0 4.5 - 8.0    Specific gravity 1.018 1.003 - 1.03     Assessment and plan:  1. Chronic pain. We discussed his THC use. Per the contract, we agreed to discharge him from the practice. Will cover with meds to allow him time to find another provider. Tapering instructions to be provided      Above conditions discussed at length and patient vocalized understanding.   All questions answered to patient satifaction

## 2017-08-25 ENCOUNTER — TELEPHONE (OUTPATIENT)
Dept: INTERNAL MEDICINE CLINIC | Age: 72
End: 2017-08-25

## 2017-08-25 NOTE — TELEPHONE ENCOUNTER
----- Message from Betito Arzola MD sent at 8/23/2017 12:21 PM EDT -----  Pt left the practice/was discharged 6 weeks ago or so  pls remove from my panel

## 2017-09-19 ENCOUNTER — HOSPITAL ENCOUNTER (OUTPATIENT)
Dept: GENERAL RADIOLOGY | Age: 72
Discharge: HOME | End: 2017-09-19
Payer: COMMERCIAL

## 2017-09-19 DIAGNOSIS — M79.0 CHRONIC ARTICULAR RHEUMATISM: ICD-10-CM

## 2017-09-19 PROCEDURE — 73630 X-RAY EXAM OF FOOT: CPT

## 2017-09-21 NOTE — PROGRESS NOTES
RD,  There are several sets of open orders in the patients chart. There were open orders from March before you released him from the practice. Patient is now seeing Dr. Jose R Bledsoe but they have not ordered any labs as of yet. Nurse, Can you please close out any open orders in this chart?

## 2017-09-28 DIAGNOSIS — E78.5 HYPERLIPIDEMIA, UNSPECIFIED HYPERLIPIDEMIA TYPE: ICD-10-CM

## 2018-02-27 PROBLEM — N41.0 ACUTE PROSTATITIS: Status: ACTIVE | Noted: 2018-02-27

## 2019-03-11 ENCOUNTER — APPOINTMENT (OUTPATIENT)
Dept: GENERAL RADIOLOGY | Age: 74
End: 2019-03-11
Attending: EMERGENCY MEDICINE
Payer: COMMERCIAL

## 2019-03-11 ENCOUNTER — HOSPITAL ENCOUNTER (EMERGENCY)
Age: 74
Discharge: HOME | End: 2019-03-11
Attending: EMERGENCY MEDICINE
Payer: COMMERCIAL

## 2019-03-11 VITALS
HEART RATE: 73 BPM | BODY MASS INDEX: 26.66 KG/M2 | TEMPERATURE: 98.9 F | OXYGEN SATURATION: 96 % | SYSTOLIC BLOOD PRESSURE: 122 MMHG | RESPIRATION RATE: 18 BRPM | DIASTOLIC BLOOD PRESSURE: 77 MMHG | WEIGHT: 180 LBS | HEIGHT: 69 IN

## 2019-03-11 DIAGNOSIS — S62.327A CLOSED DISPLACED FRACTURE OF SHAFT OF FIFTH METACARPAL BONE OF LEFT HAND, INITIAL ENCOUNTER: Primary | ICD-10-CM

## 2019-03-11 PROCEDURE — 73130 X-RAY EXAM OF HAND: CPT

## 2019-03-11 PROCEDURE — 99283 EMERGENCY DEPT VISIT LOW MDM: CPT

## 2019-03-11 PROCEDURE — 75810000053 HC SPLINT APPLICATION

## 2019-03-11 RX ORDER — HYDROCODONE BITARTRATE AND ACETAMINOPHEN 5; 325 MG/1; MG/1
1 TABLET ORAL
Qty: 10 TAB | Refills: 0 | Status: SHIPPED | OUTPATIENT
Start: 2019-03-11 | End: 2019-03-14

## 2019-03-11 NOTE — ED PROVIDER NOTES
Sneha Benites is a 68 y.o. Male c/o Lt hand pain, swelling, and bruising after pt was using a metal mallet and he states the mallet slipped out of his Rt hand and came down onto his Lt hand. He has not taken anything for pain. He reports pain is 7/10, worse when trying to move his 4-5th fingers. Pain and swelling is to back of his hand near pinky finger. Pt is Rt handed. He denies numbness/tingling. He denies open wound.              Past Medical History:   Diagnosis Date    Abuse     h/o alcohol abstinent since 1/13    Asbestosis(501) 1997    dx'ed in [place], f/u xrays negative    Bilateral hearing loss     Cervical spinal stenosis 08/2016    Dt Ruth; severe C5-6 on mri    Chronic pain     DIRE score 18 from 5/16; no response gabapentin/lyrica/topamax/cymbalta; controlled substance agreement in place    Chronic prostatitis     Dr. Vibha Dill COPD 7/11    on CXR    DJD (degenerative joint disease)     c spine on mri 2004, t spine mri 10/13; saw Dr Himanshu Pennington; intol lyrica, topamax and cymbalta; no response to gabapentin    Epididymitis     recurrent Dr Lauryn Gomez 10/14; also gb polyps, no stones    FHx: heart disease     GERD (gastroesophageal reflux disease)     h/o gastritis; s/p dilation 8/14 Dr Lenore Parmar    Hyperlipidemia     calculated 10 year risk score 12.6% (2014)    Hypovitaminosis D 7/14    Microscopic hematuria     Pancreatitis     10/14 from binge alcohol drinking; on CT 10/14 [facility]    PUD (peptic ulcer disease) 8/14    antral ulcers, gastritis, gr 3 esophagitis, esoph ring s/p dilation, neg h pylori Dr Lenore Parmar    Recurrent kidney stones     2006 Dr. Maurice Grant; 2015       Past Surgical History:   Procedure Laterality Date   [address]    thallium negative    HX CATARACT REMOVAL Bilateral 2012    HX COLONOSCOPY      Dr. Remi Brown 2005 negative    HX HEMORRHOIDECTOMY  2005    Dr. Loly Bey  2006    left knee surgery/meniscus/patella in [place], [place] workmans comp    HX UROLOGICAL  4/15    [facility], Dr. Maribell Wang, Cystoscopy, left retrograde ureteroscopy, holmium laser lithotripsy, double j catheter          Family History:   Problem Relation Age of Onset    Hypertension Mother     Diabetes Father     Heart Disease Father        Social History     Socioeconomic History    Marital status:      Spouse name: Not on file    Number of children: 10    Years of education: Not on file    Highest education level: Not on file   Social Needs    Financial resource strain: Not on file    Food insecurity - worry: Not on file    Food insecurity - inability: Not on file   zanda needs - medical: Not on file   zanda needs - non-medical: Not on file   Occupational History    Occupation:  [employer]   Tobacco Use    Smoking status: Former Smoker     Packs/day: 1.00     Last attempt to quit: 2011     Years since quittin.1    Smokeless tobacco: Former User   Substance and Sexual Activity    Alcohol use: Yes     Comment: socially    Drug use: No    Sexual activity: Not on file   Other Topics Concern    Not on file   Social History Narrative    Not on file         ALLERGIES: Benadryl [diphenhydramine hcl]; Cymbalta [duloxetine]; Flexeril [cyclobenzaprine]; Gabapentin; Lipitor [atorvastatin]; Lyrica [pregabalin]; Nexium [esomeprazole magnesium]; Pamelor [nortriptyline]; Pravastatin; Septra [sulfamethoprim ds]; Sinequan [doxepin]; and Topamax [topiramate]    Review of Systems   Constitutional: Negative for diaphoresis and fatigue. Respiratory: Negative. Cardiovascular: Negative. Gastrointestinal: Negative. Musculoskeletal: Positive for arthralgias and joint swelling. Skin: Positive for color change. bruising   Allergic/Immunologic: Negative. Neurological: Negative for weakness, numbness and headaches. Psychiatric/Behavioral: Negative.         Vitals:    19 1814 BP: 122/77   Pulse: 73   Resp: 18   Temp: 98.9 °F (37.2 °C)   SpO2: 96%   Weight: 81.6 kg (180 lb)   Height: 5' 9\" (1.753 m)            Physical Exam   Constitutional: He is oriented to person, place, and time. He appears well-developed and well-nourished. He is cooperative. Non-toxic appearance. No distress. Pt appears comfortable, pleasant, in NAD   HENT:   Head: Normocephalic and atraumatic. Right Ear: External ear normal.   Left Ear: External ear normal.   Nose: Nose normal.   Mouth/Throat: Mucous membranes are normal.   Eyes: Conjunctivae, EOM and lids are normal. No scleral icterus. Neck: Normal range of motion. Neck supple. Cardiovascular: Normal rate, regular rhythm and normal heart sounds. Pulmonary/Chest: Effort normal and breath sounds normal. No respiratory distress. Abdominal: Soft. Normal appearance. There is no tenderness. There is no rebound. Musculoskeletal: He exhibits no edema. Left hand: He exhibits decreased range of motion, tenderness, bony tenderness, deformity and swelling. He exhibits normal capillary refill. Hands:  Dorsal aspect LT hand and 4th-5th metacarpals with tenderness, swelling, and bruising. Maximal tenderness at distal 5th metacarpal  Pain with extension and flexion of Lt 5th digit at MCP joint. Neurological: He is alert and oriented to person, place, and time. No sensory deficit. He exhibits normal muscle tone. Skin: Skin is warm and intact. Ecchymosis noted. Lt hand dorsal aspect 4th-5th metacarpals with bruising and swelling, skin intact. Psychiatric: He has a normal mood and affect. His speech is normal and behavior is normal. Judgment and thought content normal.   Nursing note and vitals reviewed.        MDM  Number of Diagnoses or Management Options  Closed displaced fracture of shaft of fifth metacarpal bone of left hand, initial encounter:   Diagnosis management comments: Lt hand with pain, swelling, bruising, deformity after struck with a mallet accidentally prior to arrival.  Likely fx, check Xray for fx.  neurovasc intact. Splint, Ulnar Gutter  Date/Time: 3/12/2019 9:57 PM  Performed by: Tomas Power PA-C  Authorized by: Tomas Power PA-C     Consent:     Consent obtained:  Verbal    Consent given by:  Patient    Risks discussed:  Numbness and pain  Pre-procedure details:     Sensation:  Normal  Procedure details:     Location:  Hand    Hand:  L hand    Cast type:  Short arm    Splint type:  Ulnar gutter    Supplies:  Ortho-Glass  Post-procedure details:     Pain:  Unchanged    Sensation:  Normal    Patient tolerance of procedure: Tolerated well, no immediate complications  Comments:      neurovasc intact before and after procedure. Pt reports post mold feels comfortable, not too tight. Medications ordered:   Medications - No data to display      Lab findings:  No results found for this or any previous visit (from the past 12 hour(s)). X-Ray, CT or other radiology findings or impressions:  XR HAND LT MIN 3 V   Final Result   IMPRESSION:      Acute oblique fracture of distal fifth metacarpal with mild angulation and   displacement as above. Thank you for your referral.           Reevaluation of patient:   Pt offered pain medication in ED but declined. Pt offered arm sling for comfort after post mold but declined. Disposition:  Diagnosis:   1.  Closed displaced fracture of shaft of fifth metacarpal bone of left hand, initial encounter        Disposition: home    Follow-up Information     Follow up With Specialties Details [facility] Emergency Medicine  If symptoms worsen, As needed [address]    Liyah West MD Orthopedic Surgery Schedule an appointment as soon as possible for a visit in 1 day for re-evaluation [facility] [address] [place] [id number]  [phone]                 Medication List      START taking these medications    HYDROcodone-acetaminophen 5-325 mg per tablet  Commonly known as:  NORCO  Take 1 Tab by mouth every six (6) hours as needed for Pain for up to 3 days. Max Daily Amount: 4 Tabs. Take 1 tab PO every 4-6 hours as needed for pain           Where to Get Your Medications      Information about where to get these medications is not yet available    Ask your nurse or doctor about these medications  · HYDROcodone-acetaminophen 5-325 mg per tablet         The patient will be discharged home. Warning signs of worsening condition were discussed and the patient verbalized understanding. Based on patient's age, coexisting illness, exam, and the results of this ED evaluation, the decision to treat as an outpatient was made. While it is impossible to completely exclude the possibility of underlying serious disease or worsening of condition, I feel the relative likelihood is extremely low. I discussed this uncertainty with the patient, who understood ED evaluation and treatment and felt comfortable with the outpatient treatment plan. All questions regarding care, test results, and follow up were answered. The patient is stable and appropriate to discharge. Patient understands importance to return to the emergency department for any new or worsening symptoms. I stressed the importance of follow up for repeat assessment and possibly further evaluation/treatment.     Cata Chan PA-C

## 2019-03-11 NOTE — ED TRIAGE NOTES
States having left hand smashed by 10 lb mallet head today. Patient noted to have swelling to outer aspect of hand.

## 2019-03-11 NOTE — DISCHARGE INSTRUCTIONS
Patient Education   Keep your splint clean and dry. Do not remove  Elevate your hand to help reduce swelling. Take Ibuprofen for mild to moderate pain and take Norco for severe pain     Wearing a Plaster Cast: Care Instructions  Your Care Instructions    A cast protects a broken bone or other injury while it heals. Your cast is made of plaster. After a cast is put on, you can't remove it yourself. Your doctor or a technician will take it off. Follow-up care is a key part of your treatment and safety. Be sure to make and go to all appointments, and call your doctor if you are having problems. It's also a good idea to know your test results and keep a list of the medicines you take. How can you care for yourself at home? General care  · Follow your doctor's instructions for when you can start using the limb that has the cast. Plaster casts may take several days before they are hard enough to protect the injured limb. · When it's okay to put weight on your leg or foot cast, don't stand or walk on it unless it's designed for walking. · Prop up the injured arm or leg on a pillow anytime you sit or lie down during the first 3 days. Try to keep it above the level of your heart. This will help reduce swelling. · Put ice or a cold pack on your cast for 10 to 20 minutes at a time. Try to do this every 1 to 2 hours for the next 3 days (when you are awake). Put a thin cloth between the ice and your cast. Keep the cast dry. · Be safe with medicines. Read and follow all instructions on the label. ? If the doctor gave you a prescription medicine for pain, take it as prescribed. ? If you are not taking a prescription pain medicine, ask your doctor if you can take an over-the-counter medicine. · Do exercises as instructed by your doctor or physical therapist. These exercises will help keep your muscles strong and your joints flexible while you heal.  · Wiggle your fingers or toes on the injured arm or leg often.  This helps reduce swelling and stiffness. Water and your cast  · Keep your cast completely dry. The plaster will start to break down if it gets wet. · Use a bag or tape a sheet of plastic to cover your cast when you take a shower or bath or when you have any other contact with water. (Don't take a bath unless you can keep the cast out of the water.) Moisture can collect under the cast and cause skin irritation and itching. It can make infection more likely if you had surgery or have a wound under the cast.  Cast and skin care  · Try blowing cool air from a hair dryer or fan into the cast to help relieve itching. Never stick items under your cast to scratch the skin. · Don't use oils or lotions near your cast. If the skin gets red or irritated around the edge of the cast, you may pad the edges with a soft material or use tape to cover them. When should you call for help? Call your doctor now or seek immediate medical care if:    · You have increased or severe pain.     · You feel a warm or painful spot under the cast.     · You have problems with your cast. For example:  ? The skin under the cast burns or stings. ? The cast feels too tight. ? There is a lot of swelling near the cast. (Some swelling is normal.)  ? You have a new fever. ? There is drainage or a bad smell coming from the cast.     · Your foot or hand is cool or pale or changes color.     · You have trouble moving your fingers or toes.     · You have symptoms of a blood clot in your arm or leg (called a deep vein thrombosis). These may include:  ? Pain in the arm, calf, back of the knee, thigh, or groin. ? Redness and swelling in the arm, leg, or groin.    Watch closely for changes in your health, and be sure to contact your doctor if:    · The cast is breaking apart.     · You are not getting better as expected. Where can you learn more? Go to http://jeffrey-angelo.info/.   Enter P140 in the search box to learn more about \"Wearing a Plaster Cast: Care Instructions. \"  Current as of: September 20, 2018  Content Version: 11.9  © 1302-8264 [employer]. Care instructions adapted under license by [employer] (which disclaims liability or warranty for this information). If you have questions about a medical condition or this instruction, always ask your healthcare professional. [facility] any warranty or liability for your use of this information. Patient Education        Broken Hand: Care Instructions  Your Care Instructions  A hand can break (fracture) during sports, a fall, or other accidents. The break may happen when your hand twists, is hit, or is used to protect you in a fall. Fractures can range from a small, hairline crack, to a bone or bones broken into two or more pieces. Your treatment depends on how bad the break is. Your doctor may have put your hand in a brace, splint, or cast to allow it to heal or to keep it stable until you see another doctor. It may take weeks or months for your hand to heal. You can help it heal with some care at home. You heal best when you take good care of yourself. Eat a variety of healthy foods, and don't smoke. Follow-up care is a key part of your treatment and safety. Be sure to make and go to all appointments, and call your doctor if you are having problems. It's also a good idea to know your test results and keep a list of the medicines you take. How can you care for yourself at home? · Put ice or a cold pack on your hand for 10 to 20 minutes at a time. Try to do this every 1 to 2 hours for the next 3 days (when you are awake). Put a thin cloth between the ice and your cast or splint. Keep your cast or splint dry. · Follow the cast care instructions your doctor gives you. If you have a splint, do not take it off unless your doctor tells you to. · Be safe with medicines. Take pain medicines exactly as directed.   ? If the doctor gave you a prescription medicine for pain, take it as prescribed. ? If you are not taking a prescription pain medicine, ask your doctor if you can take an over-the-counter medicine. · Prop up your hand on pillows when you sit or lie down in the first few days after the injury. Keep your hand higher than the level of your heart. This will help reduce swelling. · Follow instructions for exercises to keep your arm strong. · Wiggle your uninjured fingers often to reduce swelling and stiffness, but do not use that hand to grasp or carry anything. When should you call for help? Call your doctor now or seek immediate medical care if:    · You have new or worse pain.     · Your hand or fingers are cool or pale or change color.     · Your cast or splint feels too tight.     · You have tingling, weakness, or numbness in your hand or fingers.    Watch closely for changes in your health, and be sure to contact your doctor if:    · You do not get better as expected.     · You have problems with your cast or splint. Where can you learn more? Go to http://jeffrey-angelo.info/. Enter ([phone] [phone] in the search box to learn more about \"Broken Hand: Care Instructions. \"  Current as of: September 20, 2018  Content Version: 11.9  © 9836-8896 [employer]. Care instructions adapted under license by [employer] (which disclaims liability or warranty for this information). If you have questions about a medical condition or this instruction, always ask your healthcare professional. [place] [address] any warranty or liability for your use of this information.

## 2020-02-20 ENCOUNTER — HOSPITAL ENCOUNTER (EMERGENCY)
Age: 75
Discharge: OTHER INSTITUTION | End: 2020-02-20
Attending: EMERGENCY MEDICINE
Payer: COMMERCIAL

## 2020-02-20 ENCOUNTER — APPOINTMENT (OUTPATIENT)
Dept: CT IMAGING | Age: 75
End: 2020-02-20
Attending: EMERGENCY MEDICINE
Payer: COMMERCIAL

## 2020-02-20 ENCOUNTER — APPOINTMENT (OUTPATIENT)
Dept: GENERAL RADIOLOGY | Age: 75
End: 2020-02-20
Attending: EMERGENCY MEDICINE
Payer: COMMERCIAL

## 2020-02-20 VITALS
RESPIRATION RATE: 16 BRPM | SYSTOLIC BLOOD PRESSURE: 86 MMHG | BODY MASS INDEX: 26.37 KG/M2 | TEMPERATURE: 98 F | DIASTOLIC BLOOD PRESSURE: 65 MMHG | OXYGEN SATURATION: 100 % | WEIGHT: 178.57 LBS | HEART RATE: 69 BPM

## 2020-02-20 DIAGNOSIS — D49.6 BRAIN TUMOR (HCC): Primary | ICD-10-CM

## 2020-02-20 LAB
AMPHET UR QL SCN: NEGATIVE
ANION GAP SERPL CALC-SCNC: 14 MMOL/L (ref 3–18)
APTT PPP: 28.2 SEC (ref 23–36.4)
ARTERIAL PATENCY WRIST A: YES
BARBITURATES UR QL SCN: NEGATIVE
BASE DEFICIT BLD-SCNC: 1 MMOL/L
BASOPHILS # BLD: 0 K/UL (ref 0–0.1)
BASOPHILS NFR BLD: 1 % (ref 0–2)
BDY SITE: ABNORMAL
BENZODIAZ UR QL: POSITIVE
BUN SERPL-MCNC: 26 MG/DL (ref 7–18)
BUN/CREAT SERPL: 25 (ref 12–20)
CALCIUM SERPL-MCNC: 9.3 MG/DL (ref 8.5–10.1)
CANNABINOIDS UR QL SCN: POSITIVE
CHLORIDE SERPL-SCNC: 107 MMOL/L (ref 100–111)
CO2 SERPL-SCNC: 19 MMOL/L (ref 21–32)
COCAINE UR QL SCN: NEGATIVE
CREAT SERPL-MCNC: 1.03 MG/DL (ref 0.6–1.3)
DIFFERENTIAL METHOD BLD: ABNORMAL
EOSINOPHIL # BLD: 0.1 K/UL (ref 0–0.4)
EOSINOPHIL NFR BLD: 1 % (ref 0–5)
ERYTHROCYTE [DISTWIDTH] IN BLOOD BY AUTOMATED COUNT: 13.7 % (ref 11.6–14.5)
ETHANOL SERPL-MCNC: <3 MG/DL (ref 0–3)
GAS FLOW.O2 O2 DELIVERY SYS: ABNORMAL L/MIN
GAS FLOW.O2 SETTING OXYMISER: 14 BPM
GLUCOSE SERPL-MCNC: 126 MG/DL (ref 74–99)
HCO3 BLD-SCNC: 26.9 MMOL/L (ref 22–26)
HCT VFR BLD AUTO: 43.7 % (ref 36–48)
HDSCOM,HDSCOM: ABNORMAL
HGB BLD-MCNC: 14.1 G/DL (ref 13–16)
INR PPP: 1 (ref 0.8–1.2)
LYMPHOCYTES # BLD: 4.7 K/UL (ref 0.9–3.6)
LYMPHOCYTES NFR BLD: 55 % (ref 21–52)
MCH RBC QN AUTO: 29.3 PG (ref 24–34)
MCHC RBC AUTO-ENTMCNC: 32.3 G/DL (ref 31–37)
MCV RBC AUTO: 90.7 FL (ref 74–97)
METHADONE UR QL: NEGATIVE
MONOCYTES # BLD: 0.6 K/UL (ref 0.05–1.2)
MONOCYTES NFR BLD: 7 % (ref 3–10)
NEUTS SEG # BLD: 3 K/UL (ref 1.8–8)
NEUTS SEG NFR BLD: 36 % (ref 40–73)
O2/TOTAL GAS SETTING VFR VENT: 100 %
OPIATES UR QL: NEGATIVE
PCO2 BLD: 56.3 MMHG (ref 35–45)
PCP UR QL: NEGATIVE
PEEP RESPIRATORY: 5 CMH2O
PH BLD: 7.29 [PH] (ref 7.35–7.45)
PLATELET # BLD AUTO: 241 K/UL (ref 135–420)
PMV BLD AUTO: 11.2 FL (ref 9.2–11.8)
PO2 BLD: 373 MMHG (ref 80–100)
POTASSIUM SERPL-SCNC: 3.9 MMOL/L (ref 3.5–5.5)
PROTHROMBIN TIME: 12.7 SEC (ref 11.5–15.2)
RBC # BLD AUTO: 4.82 M/UL (ref 4.7–5.5)
SAO2 % BLD: 100 % (ref 92–97)
SERVICE CMNT-IMP: ABNORMAL
SODIUM SERPL-SCNC: 140 MMOL/L (ref 136–145)
SPECIMEN TYPE: ABNORMAL
VENTILATION MODE VENT: ABNORMAL
VOLUME CONTROL PLUS IVLCP: YES
VT SETTING VENT: 450 ML
WBC # BLD AUTO: 8.5 K/UL (ref 4.6–13.2)

## 2020-02-20 PROCEDURE — 85025 COMPLETE CBC W/AUTO DIFF WBC: CPT

## 2020-02-20 PROCEDURE — 74011000250 HC RX REV CODE- 250: Performed by: EMERGENCY MEDICINE

## 2020-02-20 PROCEDURE — 74011250636 HC RX REV CODE- 250/636

## 2020-02-20 PROCEDURE — 85730 THROMBOPLASTIN TIME PARTIAL: CPT

## 2020-02-20 PROCEDURE — 74011636320 HC RX REV CODE- 636/320: Performed by: EMERGENCY MEDICINE

## 2020-02-20 PROCEDURE — 74011250636 HC RX REV CODE- 250/636: Performed by: EMERGENCY MEDICINE

## 2020-02-20 PROCEDURE — 93005 ELECTROCARDIOGRAM TRACING: CPT

## 2020-02-20 PROCEDURE — 96374 THER/PROPH/DIAG INJ IV PUSH: CPT

## 2020-02-20 PROCEDURE — 70496 CT ANGIOGRAPHY HEAD: CPT

## 2020-02-20 PROCEDURE — 96365 THER/PROPH/DIAG IV INF INIT: CPT

## 2020-02-20 PROCEDURE — 82803 BLOOD GASES ANY COMBINATION: CPT

## 2020-02-20 PROCEDURE — 96366 THER/PROPH/DIAG IV INF ADDON: CPT

## 2020-02-20 PROCEDURE — 96376 TX/PRO/DX INJ SAME DRUG ADON: CPT

## 2020-02-20 PROCEDURE — 99285 EMERGENCY DEPT VISIT HI MDM: CPT

## 2020-02-20 PROCEDURE — 96375 TX/PRO/DX INJ NEW DRUG ADDON: CPT

## 2020-02-20 PROCEDURE — 94002 VENT MGMT INPAT INIT DAY: CPT

## 2020-02-20 PROCEDURE — 31500 INSERT EMERGENCY AIRWAY: CPT

## 2020-02-20 PROCEDURE — 85610 PROTHROMBIN TIME: CPT

## 2020-02-20 PROCEDURE — 80307 DRUG TEST PRSMV CHEM ANLYZR: CPT

## 2020-02-20 PROCEDURE — 70450 CT HEAD/BRAIN W/O DYE: CPT

## 2020-02-20 PROCEDURE — 36600 WITHDRAWAL OF ARTERIAL BLOOD: CPT

## 2020-02-20 PROCEDURE — 80048 BASIC METABOLIC PNL TOTAL CA: CPT

## 2020-02-20 PROCEDURE — 71045 X-RAY EXAM CHEST 1 VIEW: CPT

## 2020-02-20 RX ORDER — FENTANYL CITRATE 50 UG/ML
100 INJECTION, SOLUTION INTRAMUSCULAR; INTRAVENOUS
Status: COMPLETED | OUTPATIENT
Start: 2020-02-20 | End: 2020-02-20

## 2020-02-20 RX ORDER — LORAZEPAM 2 MG/ML
2 INJECTION INTRAMUSCULAR
Status: COMPLETED | OUTPATIENT
Start: 2020-02-20 | End: 2020-02-20

## 2020-02-20 RX ORDER — PROPOFOL 10 MG/ML
0-50 VIAL (ML) INTRAVENOUS
Status: DISCONTINUED | OUTPATIENT
Start: 2020-02-20 | End: 2020-02-21 | Stop reason: HOSPADM

## 2020-02-20 RX ORDER — FENTANYL CITRATE 50 UG/ML
INJECTION, SOLUTION INTRAMUSCULAR; INTRAVENOUS
Status: COMPLETED
Start: 2020-02-20 | End: 2020-02-20

## 2020-02-20 RX ORDER — KETAMINE HYDROCHLORIDE 50 MG/ML
INJECTION, SOLUTION INTRAMUSCULAR; INTRAVENOUS
Status: DISCONTINUED
Start: 2020-02-20 | End: 2020-02-21 | Stop reason: HOSPADM

## 2020-02-20 RX ORDER — ROCURONIUM BROMIDE 10 MG/ML
100 INJECTION, SOLUTION INTRAVENOUS
Status: COMPLETED | OUTPATIENT
Start: 2020-02-20 | End: 2020-02-20

## 2020-02-20 RX ORDER — MIDAZOLAM HYDROCHLORIDE 1 MG/ML
2 INJECTION, SOLUTION INTRAMUSCULAR; INTRAVENOUS
Status: DISCONTINUED | OUTPATIENT
Start: 2020-02-20 | End: 2020-02-21 | Stop reason: HOSPADM

## 2020-02-20 RX ORDER — LEVETIRACETAM 15 MG/ML
1500 INJECTION INTRAVASCULAR ONCE
Status: COMPLETED | OUTPATIENT
Start: 2020-02-20 | End: 2020-02-20

## 2020-02-20 RX ORDER — PROPOFOL 10 MG/ML
0-50 VIAL (ML) INTRAVENOUS
Status: DISCONTINUED | OUTPATIENT
Start: 2020-02-20 | End: 2020-02-20 | Stop reason: CLARIF

## 2020-02-20 RX ORDER — ETOMIDATE 2 MG/ML
30 INJECTION INTRAVENOUS
Status: COMPLETED | OUTPATIENT
Start: 2020-02-20 | End: 2020-02-20

## 2020-02-20 RX ORDER — LORAZEPAM 2 MG/ML
INJECTION INTRAMUSCULAR
Status: COMPLETED
Start: 2020-02-20 | End: 2020-02-20

## 2020-02-20 RX ORDER — FENTANYL CITRATE 50 UG/ML
100 INJECTION, SOLUTION INTRAMUSCULAR; INTRAVENOUS
Status: DISCONTINUED | OUTPATIENT
Start: 2020-02-20 | End: 2020-02-21 | Stop reason: HOSPADM

## 2020-02-20 RX ORDER — PROPOFOL 10 MG/ML
5 VIAL (ML) INTRAVENOUS
Status: DISCONTINUED | OUTPATIENT
Start: 2020-02-20 | End: 2020-02-20 | Stop reason: CLARIF

## 2020-02-20 RX ORDER — MIDAZOLAM HYDROCHLORIDE 1 MG/ML
2 INJECTION, SOLUTION INTRAMUSCULAR; INTRAVENOUS ONCE
Status: COMPLETED | OUTPATIENT
Start: 2020-02-20 | End: 2020-02-20

## 2020-02-20 RX ORDER — PROPOFOL 10 MG/ML
5 VIAL (ML) INTRAVENOUS
Status: DISCONTINUED | OUTPATIENT
Start: 2020-02-20 | End: 2020-02-20

## 2020-02-20 RX ORDER — MIDAZOLAM HYDROCHLORIDE 1 MG/ML
INJECTION, SOLUTION INTRAMUSCULAR; INTRAVENOUS
Status: COMPLETED
Start: 2020-02-20 | End: 2020-02-20

## 2020-02-20 RX ORDER — KETAMINE HYDROCHLORIDE 50 MG/ML
100 INJECTION, SOLUTION INTRAMUSCULAR; INTRAVENOUS ONCE
Status: COMPLETED | OUTPATIENT
Start: 2020-02-20 | End: 2020-02-20

## 2020-02-20 RX ADMIN — PROPOFOL 20 MCG/KG/MIN: 10 INJECTION, EMULSION INTRAVENOUS at 20:30

## 2020-02-20 RX ADMIN — FENTANYL CITRATE 100 MCG: 50 INJECTION INTRAMUSCULAR; INTRAVENOUS at 18:23

## 2020-02-20 RX ADMIN — KETAMINE HYDROCHLORIDE 100 MG: 50 INJECTION INTRAMUSCULAR; INTRAVENOUS at 23:13

## 2020-02-20 RX ADMIN — FENTANYL CITRATE 100 MCG: 50 INJECTION INTRAMUSCULAR; INTRAVENOUS at 17:40

## 2020-02-20 RX ADMIN — MIDAZOLAM 2 MG: 1 INJECTION INTRAMUSCULAR; INTRAVENOUS at 18:23

## 2020-02-20 RX ADMIN — ROCURONIUM BROMIDE 100 MG: 10 INJECTION, SOLUTION INTRAVENOUS at 17:00

## 2020-02-20 RX ADMIN — FENTANYL CITRATE 100 MCG: 50 INJECTION INTRAMUSCULAR; INTRAVENOUS at 16:40

## 2020-02-20 RX ADMIN — PROPOFOL 5 MCG/KG/MIN: 10 INJECTION, EMULSION INTRAVENOUS at 17:33

## 2020-02-20 RX ADMIN — FENTANYL CITRATE 100 MCG: 50 INJECTION INTRAMUSCULAR; INTRAVENOUS at 18:45

## 2020-02-20 RX ADMIN — MIDAZOLAM HYDROCHLORIDE 2 MG: 1 INJECTION, SOLUTION INTRAMUSCULAR; INTRAVENOUS at 16:40

## 2020-02-20 RX ADMIN — MIDAZOLAM 2 MG: 1 INJECTION INTRAMUSCULAR; INTRAVENOUS at 18:45

## 2020-02-20 RX ADMIN — PROPOFOL 25 MCG/KG/MIN: 10 INJECTION, EMULSION INTRAVENOUS at 20:15

## 2020-02-20 RX ADMIN — FENTANYL CITRATE 100 MCG: 50 INJECTION INTRAMUSCULAR; INTRAVENOUS at 18:27

## 2020-02-20 RX ADMIN — PROPOFOL 25 MCG/KG/MIN: 10 INJECTION, EMULSION INTRAVENOUS at 21:30

## 2020-02-20 RX ADMIN — PROPOFOL 50 MCG/KG/MIN: 10 INJECTION, EMULSION INTRAVENOUS at 18:35

## 2020-02-20 RX ADMIN — PROPOFOL 30 MCG/KG/MIN: 10 INJECTION, EMULSION INTRAVENOUS at 20:00

## 2020-02-20 RX ADMIN — FENTANYL CITRATE 100 MCG: 50 INJECTION, SOLUTION INTRAMUSCULAR; INTRAVENOUS at 17:40

## 2020-02-20 RX ADMIN — LEVETIRACETAM 1500 MG: 1500 INJECTION, SOLUTION INTRAVENOUS at 17:55

## 2020-02-20 RX ADMIN — IOPAMIDOL 100 ML: 755 INJECTION, SOLUTION INTRAVENOUS at 17:04

## 2020-02-20 RX ADMIN — ETOMIDATE 30 MG: 40 INJECTION, SOLUTION INTRAVENOUS at 17:00

## 2020-02-20 RX ADMIN — FENTANYL CITRATE 100 MCG: 50 INJECTION, SOLUTION INTRAMUSCULAR; INTRAVENOUS at 16:40

## 2020-02-20 RX ADMIN — LORAZEPAM 2 MG: 2 INJECTION INTRAMUSCULAR; INTRAVENOUS at 16:30

## 2020-02-20 RX ADMIN — MIDAZOLAM 2 MG: 1 INJECTION INTRAMUSCULAR; INTRAVENOUS at 16:40

## 2020-02-20 RX ADMIN — LORAZEPAM 2 MG: 2 INJECTION INTRAMUSCULAR at 16:30

## 2020-02-20 RX ADMIN — MIDAZOLAM 2 MG: 1 INJECTION INTRAMUSCULAR; INTRAVENOUS at 18:27

## 2020-02-20 NOTE — ED NOTES
Patient arrived via EMS with c/o altered mental status. Upon arrival patient was on stretcher and unable to express himself verbally. Code stroke was called on patient. Patient had a witnessed seizure by Dr. Magali Lintno. Patient was intubated to protect patient airway.

## 2020-02-20 NOTE — ED PROVIDER NOTES
EMERGENCY DEPARTMENT HISTORY AND PHYSICAL EXAM    6:35 PM  Date: 2/20/2020  Patient Name: Yon Macias    History of Presenting Illness     Chief Complaint   Patient presents with    Altered mental status    Seizure        History Provided By: Patient's Wife and EMS    HPI: Yon Macias is a 69 y.o. male with history of arthritis. Patient was brought in by ambulance for altered mental status. According to his wife he was a little agitated around 1 PM and came home around 3 PM and he said that he is concerned that he is having a stroke because he is confused. Around 315 he was unable to form sentences so the wife called 911. Upon arrival of EMS he was a little bit agitated and unable to speak. Upon my evaluation the patient demonstrated both expressive and receptive aphasia so Code S was immediately activated. According to the wife he has never had any symptoms like that before no strip seizures or headaches or changes in personality recently. Location:  Severity:  Timing/course:    Onset/Duration:     PCP: Coco Bernard MD    Past History     Past Medical History:  Past Medical History:   Diagnosis Date    Abuse     h/o alcohol abstinent since 1/13    Asbestosis(501) 1997    dx'ed in MD, f/u xrays negative    Bilateral hearing loss     Cervical spinal stenosis 08/2016    Dt Ruth; severe C5-6 on mri    Chronic pain     DIRE score 18 from 5/16; no response gabapentin/lyrica/topamax/cymbalta; controlled substance agreement in place    Chronic prostatitis     Dr. Leyla Oh COPD 7/11    on CXR    DJD (degenerative joint disease)     c spine on mri 2004, t spine mri 10/13; saw Dr Kelly Aviles; intol lyrica, topamax and cymbalta; no response to gabapentin    Epididymitis     recurrent Dr Alvarez Dickerson 10/14; also gb polyps, no stones    FHx: heart disease     GERD (gastroesophageal reflux disease)     h/o gastritis; s/p dilation 8/14 Dr Malia Liao    Hyperlipidemia     calculated 10 year risk score 12.6% ()    Hypovitaminosis D     Microscopic hematuria     Pancreatitis     10/14 from binge alcohol drinking; on CT 10/14 [facility]    PUD (peptic ulcer disease)     antral ulcers, gastritis, gr 3 esophagitis, esoph ring s/p dilation, neg h pylori Dr Emiliano Ambriz    Recurrent kidney stones      Dr. Abril Humphrey;        Past Surgical History:  Past Surgical History:   Procedure Laterality Date   [address]    thallium negative    HX CATARACT REMOVAL Bilateral 2012    HX COLONOSCOPY      Dr. Joanne James 2005 negative    HX HEMORRHOIDECTOMY      Dr. Cm Darden HX ORTHOPAEDIC  2006    left knee surgery/meniscus/patella in [place], [place] workmans comp    HX UROLOGICAL  4/15    [facility], Dr. Mathis , Cystoscopy, left retrograde ureteroscopy, holmium laser lithotripsy, double j catheter        Family History:  Family History   Problem Relation Age of Onset    Hypertension Mother     Diabetes Father     Heart Disease Father        Social History:  Social History     Tobacco Use    Smoking status: Former Smoker     Packs/day: 1.00     Last attempt to quit: 2011     Years since quittin.1    Smokeless tobacco: Former User   Substance Use Topics    Alcohol use: Yes     Comment: socially    Drug use: No       Allergies:   Allergies   Allergen Reactions    Benadryl [Diphenhydramine Hcl] Other (comments)     Patient states he becomes hyper      Cymbalta [Duloxetine] Other (comments)    Flexeril [Cyclobenzaprine] Other (comments)     Felt bad      Gabapentin Swelling    Lipitor [Atorvastatin] Myalgia    Lyrica [Pregabalin] Other (comments)     Weight gain    Nexium [Esomeprazole Magnesium] Other (comments)     Dry eyes    Pamelor [Nortriptyline] Hives    Pravastatin Myalgia    Septra [Sulfamethoprim Ds] Other (comments)     Agitation      Sinequan [Doxepin] Other (comments)     Irritable      Topamax [Topiramate] Other (comments)     Possible kidney stones? Review of Systems   Review of Systems   Unable to perform ROS: Mental status change        Physical Exam     Patient Vitals for the past 12 hrs:   Pulse Resp BP SpO2   02/20/20 1717 80 17     02/20/20 1716 90  128/84    02/20/20 1640 66 14  100 %       Physical Exam  Vitals signs and nursing note reviewed. HENT:      Head: Normocephalic and atraumatic. Eyes:      Extraocular Movements: Extraocular movements intact. Pupils: Pupils are equal, round, and reactive to light. Neck:      Musculoskeletal: Neck supple. Cardiovascular:      Rate and Rhythm: Normal rate. Pulmonary:      Effort: Pulmonary effort is normal. No respiratory distress. Musculoskeletal:         General: No swelling. Skin:     General: Skin is warm and dry. Neurological:      Mental Status: He is alert. Comments: Expressive and receptive aphasia. Patient is moving all 4 but is unable to follow commands. No obvious facial droop. Diagnostic Study Results     Labs -  Recent Results (from the past 12 hour(s))   CBC WITH AUTOMATED DIFF    Collection Time: 02/20/20  4:23 PM   Result Value Ref Range    WBC 8.5 4.6 - 13.2 K/uL    RBC 4.82 4.70 - 5.50 M/uL    HGB 14.1 13.0 - 16.0 g/dL    HCT 43.7 36.0 - 48.0 %    MCV 90.7 74.0 - 97.0 FL    MCH 29.3 24.0 - 34.0 PG    MCHC 32.3 31.0 - 37.0 g/dL    RDW 13.7 11.6 - 14.5 %    PLATELET 854 536 - 841 K/uL    MPV 11.2 9.2 - 11.8 FL    NEUTROPHILS 36 (L) 40 - 73 %    LYMPHOCYTES 55 (H) 21 - 52 %    MONOCYTES 7 3 - 10 %    EOSINOPHILS 1 0 - 5 %    BASOPHILS 1 0 - 2 %    ABS. NEUTROPHILS 3.0 1.8 - 8.0 K/UL    ABS. LYMPHOCYTES 4.7 (H) 0.9 - 3.6 K/UL    ABS. MONOCYTES 0.6 0.05 - 1.2 K/UL    ABS. EOSINOPHILS 0.1 0.0 - 0.4 K/UL    ABS.  BASOPHILS 0.0 0.0 - 0.1 K/UL    DF AUTOMATED     METABOLIC PANEL, BASIC    Collection Time: 02/20/20  4:23 PM   Result Value Ref Range    Sodium 140 136 - 145 mmol/L    Potassium 3.9 3.5 - 5.5 mmol/L    Chloride 107 100 - 111 mmol/L    CO2 19 (L) 21 - 32 mmol/L    Anion gap 14 3.0 - 18 mmol/L    Glucose 126 (H) 74 - 99 mg/dL    BUN 26 (H) 7.0 - 18 MG/DL    Creatinine 1.03 0.6 - 1.3 MG/DL    BUN/Creatinine ratio 25 (H) 12 - 20      GFR est AA >60 >60 ml/min/1.73m2    GFR est non-AA >60 >60 ml/min/1.73m2    Calcium 9.3 8.5 - 10.1 MG/DL   ETHYL ALCOHOL    Collection Time: 02/20/20  4:23 PM   Result Value Ref Range    ALCOHOL(ETHYL),SERUM <3 0 - 3 MG/DL   POC G3    Collection Time: 02/20/20  6:27 PM   Result Value Ref Range    Device: VENT      FIO2 (POC) 100 %    pH (POC) 7.288 (L) 7.35 - 7.45      pCO2 (POC) 56.3 (H) 35.0 - 45.0 MMHG    pO2 (POC) 373 (H) 80 - 100 MMHG    HCO3 (POC) 26.9 (H) 22 - 26 MMOL/L    sO2 (POC) 100 (H) 92 - 97 %    Base deficit (POC) 1 mmol/L    Mode ASSIST CONTROL      Tidal volume 450 ml    Set Rate 14 bpm    PEEP/CPAP (POC) 5 cmH2O    Allens test (POC) YES      Site RIGHT RADIAL      Specimen type (POC) ARTERIAL      Performed by Evaristo Berkowitz     Volume control plus YES         Radiologic Studies -   Ct Head Wo Cont    Result Date: 2/20/2020  IMPRESSION: No acute hemorrhage is apparent. Left parietal mass with central hypodensity-probable tumor. An immediate reading was provided to Dr. Elgni Gray  in the ED at 1657 hours All CT scans at this facility are performed using dose optimization technique as appropriate to the performed exam, to include automated exposure control, adjustment of the mA and/or kV according to patient's size (Including appropriate matching for site-specific examinations), or use of iterative reconstruction technique. Medical Decision Making     ED Course: Progress Notes, Reevaluation, and Consults:    6:35 PM Initial assessment performed. The patients presenting problems have been discussed, and they/their family are in agreement with the care plan formulated and outlined with them. I have encouraged them to ask questions as they arise throughout their visit.         Provider Notes (Medical Decision Making): 76-year-old male presenting with expressive and receptive aphasia that started roughly around 1 PM today. Code S was activated upon arrival and then the patient had a seizure that lasted around 1 minute full tonic-clonic in the ER. Seizure terminated before meds but I still gave him 2 mg of Ativan patient then became more agitated and and started pulling lines so I have made a decision to intubate him to get the CT scan ASAP for concerns of massive stroke versus brain bleed especially that his systolic blood pressure was 240. Tele-neurology had evaluated the patient prior to the intubation and performed a quick neuro exam and agreed with the plan of intubating him and getting the CT CTA. Head CT showed a brain tumor so initiated the plan of transferring him. Blood pressures currently controlled on the propofol. I also loaded him with Keppra. I spoke with NP Ana Emerson at [facility] who accepted the patient but because the ICU is full there the transfer center had contacted [facility] and I spoke to Dr. Chepe Patrick the neurosurgeon who accepted the patient as a transfer to be admitted under the medical service will speak to Dr. Michael Mejía who is the admitting physician. These results were discussed with his wife and daughter and they agreed on the plan. Procedures: Intubation  Date/Time: 2/20/2020 6:43 PM  Performed by: Debbie Elam MD  Authorized by: Tomasa Polanco MD     Consent:     Consent obtained:  Emergent situation    Alternatives discussed:  Delayed treatment  Pre-procedure details:     Patient status:  Altered mental status    Pretreatment meds: etomidate. Paralytics:  Rocuronium  Procedure details:     Preoxygenation:  Bag valve mask    Intubation method:  Oral    Oral intubation technique:  Video-assisted    Laryngoscope blade:   Mac 3    Tube size (mm):  7.5    Tube type:  Cuffed    Number of attempts:  1    Cricoid pressure: no      Tube visualized through cords: yes    Placement assessment:     ETT to lip:  23    Tube secured with:  ETT barber    Breath sounds:  Equal    Placement verification: chest rise, condensation, direct visualization, equal breath sounds, ETCO2 detector and tube exhalation      CXR findings:  ETT in proper place  Post-procedure details:     Patient tolerance of procedure: Tolerated well, no immediate complications          Critical Care Time: Upon my evaluation, this patient had a high probability of imminent or life-threatening deterioration due to altered mental status seizure concern for stroke, which required my direct attention, intervention, and personal management. I have personally provided 60 minutes of critical care time exclusive of time spent on separately billable procedures. Time includes review of laboratory data, radiology results, discussion with consultants, and monitoring for potential decompensation. Interventions were performed as documented above. Per Davis MD  6:41 PM        Vital Signs-Reviewed the patient's vital signs. Reviewed pt's pulse ox reading. EKG: Interpreted by the EP. Time Interpreted:    Rate:    Rhythm:    Interpretation:   Comparison:     Records Reviewed: Nursing Notes (Time of Review: 6:35 PM)  -I am the first provider for this patient.  -I reviewed the vital signs, available nursing notes, past medical history, past surgical history, family history and social history.     Current Facility-Administered Medications   Medication Dose Route Frequency Provider Last Rate Last Dose    propofol (DIPRIVAN) 10 mg/mL infusion  5 mcg/kg/min IntraVENous TITRATE Per Davis MD 24.3 mL/hr at 02/20/20 1825 50 mcg/kg/min at 02/20/20 1825    midazolam (VERSED) injection 2 mg  2 mg IntraVENous EVERY 2 MINUTES AS NEEDED Per Davis MD   2 mg at 02/20/20 1827    fentaNYL citrate (PF) injection 100 mcg  100 mcg IntraVENous EVERY 2 MINUTES AS NEEDED Per Davis MD   100 mcg at 02/20/20 1827    propofol (DIPRIVAN) 10 mg/mL infusion  0-50 mcg/kg/min IntraVENous TITRATE Per Davis MD            Clinical Impression     Clinical Impression: No diagnosis found. Disposition: transfer      This note was dictated utilizing voice recognition software which may lead to typographical errors. I apologize in advance if the situation occurs. If questions arise please do not hesitate to contact me or call our department.     Elizabeth Piper MD  6:35 PM

## 2020-02-20 NOTE — ED NOTES
Pt agitated and alert attempting to self extubate. RN and MD at bedside. Per Dr. Eze Barnett pt is to receive 2mg Versed every 2 minutes as needed and 100mcg Fentanyl every 2 minutes as needed.

## 2020-02-21 LAB
ATRIAL RATE: 55 BPM
CALCULATED P AXIS, ECG09: 55 DEGREES
CALCULATED R AXIS, ECG10: 25 DEGREES
CALCULATED T AXIS, ECG11: 32 DEGREES
DIAGNOSIS, 93000: NORMAL
P-R INTERVAL, ECG05: 170 MS
Q-T INTERVAL, ECG07: 422 MS
QRS DURATION, ECG06: 72 MS
QTC CALCULATION (BEZET), ECG08: 403 MS
VENTRICULAR RATE, ECG03: 55 BPM

## 2020-02-21 NOTE — ED NOTES
[facility] called, update ETA 2230, for transport to [facility]. Updated current patient status.

## 2020-02-21 NOTE — ED NOTES
Bedside shift change report given to Colt Clarke RN (oncoming nurse) by Amalia Baez RN (offgoing nurse). Report included the following information SBAR, ED Summary, MAR and Recent Results.

## 2020-03-19 NOTE — PROGRESS NOTES
1. Have you been to the ER, urgent care clinic or hospitalized since your last visit? NO.     2. Have you seen or consulted any other health care providers outside of the [address] [place] [address] since your last visit (Include any pap smears or colon screening)? NO      Do you have an Advanced Directive? NO    Would you like information on Advanced Directives?  YES Pt transferred from [facility] on 3/17 for ARDS 2/2 COVID 19. Pt was intubated on 3/15 & is on precedex, propofol, and fentanyl.   On transfer: , ferretin 368, D-Dimer >33  Pt continues to be febrile at 101    Plan:    - vent settings wean as tolerated   - continue methylprednisolone 40mg q6  - continue fentanyl and precedex- wean as tolerated hold Nimbex   - Goal to wean patient off propofol; started Versed gtt due to hypertriglyceridemia   - azithromycin & rocephin for emperic CAP coverage day 3  - f/u daily CRP  - Continuing plaquenil; waiting for approval of remdesevir

## 2020-04-03 ENCOUNTER — HOSPITAL ENCOUNTER (OUTPATIENT)
Dept: RADIATION THERAPY | Age: 75
Discharge: HOME | End: 2020-04-03
Payer: COMMERCIAL

## 2020-04-03 PROCEDURE — 99211 OFF/OP EST MAY X REQ PHY/QHP: CPT

## 2020-04-13 ENCOUNTER — HOSPITAL ENCOUNTER (OUTPATIENT)
Dept: RADIATION THERAPY | Age: 75
Discharge: HOME | End: 2020-04-13
Payer: COMMERCIAL

## 2020-04-13 LAB — CREAT UR-MCNC: 0.7 MG/DL (ref 0.6–1.3)

## 2020-04-13 PROCEDURE — 77470 SPECIAL RADIATION TREATMENT: CPT

## 2020-04-13 PROCEDURE — 82565 ASSAY OF CREATININE: CPT

## 2020-04-13 PROCEDURE — 77334 RADIATION TREATMENT AID(S): CPT

## 2020-04-16 ENCOUNTER — HOSPITAL ENCOUNTER (OUTPATIENT)
Dept: RADIATION THERAPY | Age: 75
Discharge: HOME | End: 2020-04-16
Payer: COMMERCIAL

## 2020-04-16 PROCEDURE — 77300 RADIATION THERAPY DOSE PLAN: CPT

## 2020-04-16 PROCEDURE — 77301 RADIOTHERAPY DOSE PLAN IMRT: CPT

## 2020-04-16 PROCEDURE — 77338 DESIGN MLC DEVICE FOR IMRT: CPT

## 2020-04-20 ENCOUNTER — APPOINTMENT (OUTPATIENT)
Dept: RADIATION THERAPY | Age: 75
End: 2020-04-20
Payer: COMMERCIAL

## 2020-04-27 ENCOUNTER — HOSPITAL ENCOUNTER (OUTPATIENT)
Dept: RADIATION THERAPY | Age: 75
Discharge: HOME | End: 2020-04-27
Payer: COMMERCIAL

## 2020-04-27 PROCEDURE — 77386 HC IMRT TRMT DLVR COMPL: CPT

## 2020-04-28 ENCOUNTER — HOSPITAL ENCOUNTER (OUTPATIENT)
Dept: RADIATION THERAPY | Age: 75
Discharge: HOME | End: 2020-04-28
Payer: COMMERCIAL

## 2020-04-28 PROCEDURE — 77386 HC IMRT TRMT DLVR COMPL: CPT

## 2020-04-29 ENCOUNTER — HOSPITAL ENCOUNTER (OUTPATIENT)
Dept: RADIATION THERAPY | Age: 75
Discharge: HOME | End: 2020-04-29
Payer: COMMERCIAL

## 2020-04-29 PROCEDURE — 77386 HC IMRT TRMT DLVR COMPL: CPT

## 2020-04-30 ENCOUNTER — HOSPITAL ENCOUNTER (OUTPATIENT)
Dept: RADIATION THERAPY | Age: 75
Discharge: HOME | End: 2020-04-30
Payer: COMMERCIAL

## 2020-04-30 PROCEDURE — 77386 HC IMRT TRMT DLVR COMPL: CPT

## 2020-05-01 ENCOUNTER — HOSPITAL ENCOUNTER (OUTPATIENT)
Dept: RADIATION THERAPY | Age: 75
Discharge: HOME | End: 2020-05-01
Payer: COMMERCIAL

## 2020-05-01 PROCEDURE — 77336 RADIATION PHYSICS CONSULT: CPT

## 2020-05-01 PROCEDURE — 77386 HC IMRT TRMT DLVR COMPL: CPT

## 2020-05-04 ENCOUNTER — HOSPITAL ENCOUNTER (OUTPATIENT)
Dept: RADIATION THERAPY | Age: 75
Discharge: HOME | End: 2020-05-04
Payer: COMMERCIAL

## 2020-05-04 PROCEDURE — 77386 HC IMRT TRMT DLVR COMPL: CPT

## 2020-05-05 ENCOUNTER — HOSPITAL ENCOUNTER (OUTPATIENT)
Dept: RADIATION THERAPY | Age: 75
Discharge: HOME | End: 2020-05-05
Payer: COMMERCIAL

## 2020-05-05 PROCEDURE — 77386 HC IMRT TRMT DLVR COMPL: CPT

## 2020-05-06 ENCOUNTER — HOSPITAL ENCOUNTER (OUTPATIENT)
Dept: RADIATION THERAPY | Age: 75
Discharge: HOME | End: 2020-05-06
Payer: COMMERCIAL

## 2020-05-06 PROCEDURE — 77386 HC IMRT TRMT DLVR COMPL: CPT

## 2020-05-07 ENCOUNTER — HOSPITAL ENCOUNTER (OUTPATIENT)
Dept: RADIATION THERAPY | Age: 75
Discharge: HOME | End: 2020-05-07
Payer: COMMERCIAL

## 2020-05-07 PROCEDURE — 77386 HC IMRT TRMT DLVR COMPL: CPT

## 2020-05-08 ENCOUNTER — HOSPITAL ENCOUNTER (OUTPATIENT)
Dept: RADIATION THERAPY | Age: 75
Discharge: HOME | End: 2020-05-08
Payer: COMMERCIAL

## 2020-05-08 PROCEDURE — 77336 RADIATION PHYSICS CONSULT: CPT

## 2020-05-08 PROCEDURE — 77386 HC IMRT TRMT DLVR COMPL: CPT

## 2020-05-11 ENCOUNTER — HOSPITAL ENCOUNTER (OUTPATIENT)
Dept: RADIATION THERAPY | Age: 75
Discharge: HOME | End: 2020-05-11
Payer: COMMERCIAL

## 2020-05-11 PROCEDURE — 77386 HC IMRT TRMT DLVR COMPL: CPT

## 2020-05-12 ENCOUNTER — HOSPITAL ENCOUNTER (OUTPATIENT)
Dept: RADIATION THERAPY | Age: 75
Discharge: HOME | End: 2020-05-12
Payer: COMMERCIAL

## 2020-05-12 PROCEDURE — 77386 HC IMRT TRMT DLVR COMPL: CPT

## 2020-05-13 ENCOUNTER — HOSPITAL ENCOUNTER (OUTPATIENT)
Dept: RADIATION THERAPY | Age: 75
Discharge: HOME | End: 2020-05-13
Payer: COMMERCIAL

## 2020-05-13 PROCEDURE — 77386 HC IMRT TRMT DLVR COMPL: CPT

## 2020-05-14 ENCOUNTER — HOSPITAL ENCOUNTER (OUTPATIENT)
Dept: RADIATION THERAPY | Age: 75
Discharge: HOME | End: 2020-05-14
Payer: COMMERCIAL

## 2020-05-14 PROCEDURE — 77386 HC IMRT TRMT DLVR COMPL: CPT

## 2020-05-15 ENCOUNTER — HOSPITAL ENCOUNTER (OUTPATIENT)
Dept: RADIATION THERAPY | Age: 75
Discharge: HOME | End: 2020-05-15
Payer: COMMERCIAL

## 2020-05-15 PROCEDURE — 77386 HC IMRT TRMT DLVR COMPL: CPT

## 2020-05-15 PROCEDURE — 77336 RADIATION PHYSICS CONSULT: CPT

## 2020-05-18 ENCOUNTER — HOSPITAL ENCOUNTER (OUTPATIENT)
Dept: RADIATION THERAPY | Age: 75
Discharge: HOME | End: 2020-05-18
Payer: COMMERCIAL

## 2020-05-18 PROCEDURE — 77386 HC IMRT TRMT DLVR COMPL: CPT

## 2020-05-19 ENCOUNTER — HOSPITAL ENCOUNTER (OUTPATIENT)
Dept: RADIATION THERAPY | Age: 75
Discharge: HOME | End: 2020-05-19
Payer: COMMERCIAL

## 2020-05-19 PROCEDURE — 77386 HC IMRT TRMT DLVR COMPL: CPT

## 2020-05-20 ENCOUNTER — HOSPITAL ENCOUNTER (OUTPATIENT)
Dept: RADIATION THERAPY | Age: 75
Discharge: HOME | End: 2020-05-20
Payer: COMMERCIAL

## 2020-05-20 PROCEDURE — 77386 HC IMRT TRMT DLVR COMPL: CPT

## 2020-05-21 ENCOUNTER — HOSPITAL ENCOUNTER (OUTPATIENT)
Dept: RADIATION THERAPY | Age: 75
Discharge: HOME | End: 2020-05-21
Payer: COMMERCIAL

## 2020-05-21 PROCEDURE — 77386 HC IMRT TRMT DLVR COMPL: CPT

## 2020-05-22 ENCOUNTER — HOSPITAL ENCOUNTER (OUTPATIENT)
Dept: RADIATION THERAPY | Age: 75
Discharge: HOME | End: 2020-05-22
Payer: COMMERCIAL

## 2020-05-22 PROCEDURE — 77336 RADIATION PHYSICS CONSULT: CPT

## 2020-05-22 PROCEDURE — 77386 HC IMRT TRMT DLVR COMPL: CPT

## 2020-05-22 PROCEDURE — 77300 RADIATION THERAPY DOSE PLAN: CPT

## 2020-05-22 PROCEDURE — 77338 DESIGN MLC DEVICE FOR IMRT: CPT

## 2020-05-25 ENCOUNTER — APPOINTMENT (OUTPATIENT)
Dept: RADIATION THERAPY | Age: 75
End: 2020-05-25
Payer: COMMERCIAL

## 2020-05-26 ENCOUNTER — HOSPITAL ENCOUNTER (OUTPATIENT)
Dept: RADIATION THERAPY | Age: 75
Discharge: HOME | End: 2020-05-26
Payer: COMMERCIAL

## 2020-05-26 PROCEDURE — 77386 HC IMRT TRMT DLVR COMPL: CPT

## 2020-05-27 ENCOUNTER — HOSPITAL ENCOUNTER (OUTPATIENT)
Dept: RADIATION THERAPY | Age: 75
Discharge: HOME | End: 2020-05-27
Payer: COMMERCIAL

## 2020-05-27 PROCEDURE — 77386 HC IMRT TRMT DLVR COMPL: CPT

## 2020-05-28 ENCOUNTER — HOSPITAL ENCOUNTER (OUTPATIENT)
Dept: RADIATION THERAPY | Age: 75
Discharge: HOME | End: 2020-05-28
Payer: COMMERCIAL

## 2020-05-28 PROCEDURE — 77386 HC IMRT TRMT DLVR COMPL: CPT

## 2020-05-29 ENCOUNTER — HOSPITAL ENCOUNTER (OUTPATIENT)
Dept: RADIATION THERAPY | Age: 75
Discharge: HOME | End: 2020-05-29
Payer: COMMERCIAL

## 2020-05-29 PROCEDURE — 77386 HC IMRT TRMT DLVR COMPL: CPT

## 2020-06-01 ENCOUNTER — HOSPITAL ENCOUNTER (OUTPATIENT)
Dept: RADIATION THERAPY | Age: 75
Discharge: HOME | End: 2020-06-01
Payer: COMMERCIAL

## 2020-06-01 PROCEDURE — 77336 RADIATION PHYSICS CONSULT: CPT

## 2020-06-01 PROCEDURE — 77386 HC IMRT TRMT DLVR COMPL: CPT

## 2020-06-02 ENCOUNTER — HOSPITAL ENCOUNTER (OUTPATIENT)
Dept: RADIATION THERAPY | Age: 75
Discharge: HOME | End: 2020-06-02
Payer: COMMERCIAL

## 2020-06-02 PROCEDURE — 77386 HC IMRT TRMT DLVR COMPL: CPT

## 2020-06-03 ENCOUNTER — HOSPITAL ENCOUNTER (OUTPATIENT)
Dept: RADIATION THERAPY | Age: 75
Discharge: HOME | End: 2020-06-03
Payer: COMMERCIAL

## 2020-06-03 PROCEDURE — 77386 HC IMRT TRMT DLVR COMPL: CPT

## 2020-06-04 ENCOUNTER — HOSPITAL ENCOUNTER (OUTPATIENT)
Dept: RADIATION THERAPY | Age: 75
Discharge: HOME | End: 2020-06-04
Payer: COMMERCIAL

## 2020-06-04 PROCEDURE — 77386 HC IMRT TRMT DLVR COMPL: CPT

## 2020-06-05 ENCOUNTER — HOSPITAL ENCOUNTER (OUTPATIENT)
Dept: RADIATION THERAPY | Age: 75
Discharge: HOME | End: 2020-06-05
Payer: COMMERCIAL

## 2020-06-05 PROCEDURE — 77386 HC IMRT TRMT DLVR COMPL: CPT

## 2020-06-08 ENCOUNTER — HOSPITAL ENCOUNTER (OUTPATIENT)
Dept: RADIATION THERAPY | Age: 75
Discharge: HOME | End: 2020-06-08
Payer: COMMERCIAL

## 2020-06-08 PROCEDURE — 77336 RADIATION PHYSICS CONSULT: CPT

## 2020-06-08 PROCEDURE — 77386 HC IMRT TRMT DLVR COMPL: CPT

## 2020-07-31 ENCOUNTER — HOSPITAL ENCOUNTER (OUTPATIENT)
Dept: RADIATION THERAPY | Age: 75
Discharge: HOME | End: 2020-07-31
Payer: MEDICARE

## 2020-07-31 PROCEDURE — 99211 OFF/OP EST MAY X REQ PHY/QHP: CPT

## 2020-10-24 ENCOUNTER — HOSPITAL ENCOUNTER (EMERGENCY)
Age: 75
Discharge: HOME | DRG: 100 | End: 2020-10-24
Attending: EMERGENCY MEDICINE | Admitting: EMERGENCY MEDICINE
Payer: MEDICARE

## 2020-10-24 ENCOUNTER — APPOINTMENT (OUTPATIENT)
Dept: CT IMAGING | Age: 75
DRG: 100 | End: 2020-10-24
Attending: EMERGENCY MEDICINE
Payer: MEDICARE

## 2020-10-24 ENCOUNTER — APPOINTMENT (OUTPATIENT)
Dept: GENERAL RADIOLOGY | Age: 75
DRG: 100 | End: 2020-10-24
Attending: EMERGENCY MEDICINE
Payer: MEDICARE

## 2020-10-24 VITALS
RESPIRATION RATE: 22 BRPM | OXYGEN SATURATION: 96 % | HEART RATE: 84 BPM | SYSTOLIC BLOOD PRESSURE: 153 MMHG | WEIGHT: 175 LBS | DIASTOLIC BLOOD PRESSURE: 79 MMHG | BODY MASS INDEX: 25.84 KG/M2

## 2020-10-24 DIAGNOSIS — G40.909 RECURRENT SEIZURES (HCC): Primary | ICD-10-CM

## 2020-10-24 LAB
ALBUMIN SERPL-MCNC: 4.1 G/DL (ref 3.4–5)
ALBUMIN/GLOB SERPL: 1.1 {RATIO} (ref 0.8–1.7)
ALP SERPL-CCNC: 104 U/L (ref 45–117)
ALT SERPL-CCNC: 30 U/L (ref 16–61)
ANION GAP SERPL CALC-SCNC: 7 MMOL/L (ref 3–18)
APPEARANCE UR: CLEAR
AST SERPL-CCNC: 24 U/L (ref 10–38)
ATRIAL RATE: 71 BPM
BASOPHILS # BLD: 0 K/UL (ref 0–0.1)
BASOPHILS NFR BLD: 0 % (ref 0–2)
BILIRUB SERPL-MCNC: 0.6 MG/DL (ref 0.2–1)
BILIRUB UR QL: NEGATIVE
BUN SERPL-MCNC: 14 MG/DL (ref 7–18)
BUN/CREAT SERPL: 20 (ref 12–20)
CALCIUM SERPL-MCNC: 9.9 MG/DL (ref 8.5–10.1)
CALCULATED P AXIS, ECG09: 60 DEGREES
CALCULATED R AXIS, ECG10: 14 DEGREES
CALCULATED T AXIS, ECG11: 27 DEGREES
CHLORIDE SERPL-SCNC: 107 MMOL/L (ref 100–111)
CO2 SERPL-SCNC: 26 MMOL/L (ref 21–32)
COLOR UR: YELLOW
CREAT SERPL-MCNC: 0.7 MG/DL (ref 0.6–1.3)
DIAGNOSIS, 93000: NORMAL
DIFFERENTIAL METHOD BLD: ABNORMAL
EOSINOPHIL # BLD: 0 K/UL (ref 0–0.4)
EOSINOPHIL NFR BLD: 1 % (ref 0–5)
ERYTHROCYTE [DISTWIDTH] IN BLOOD BY AUTOMATED COUNT: 15.2 % (ref 11.6–14.5)
GLOBULIN SER CALC-MCNC: 3.7 G/DL (ref 2–4)
GLUCOSE SERPL-MCNC: 109 MG/DL (ref 74–99)
GLUCOSE UR STRIP.AUTO-MCNC: NEGATIVE MG/DL
HCT VFR BLD AUTO: 43.8 % (ref 36–48)
HGB BLD-MCNC: 15.2 G/DL (ref 13–16)
HGB UR QL STRIP: NEGATIVE
INR PPP: 1 (ref 0.8–1.2)
KETONES UR QL STRIP.AUTO: 15 MG/DL
LEUKOCYTE ESTERASE UR QL STRIP.AUTO: NEGATIVE
LYMPHOCYTES # BLD: 3.1 K/UL (ref 0.9–3.6)
LYMPHOCYTES NFR BLD: 45 % (ref 21–52)
MAGNESIUM SERPL-MCNC: 2.1 MG/DL (ref 1.6–2.6)
MCH RBC QN AUTO: 30.7 PG (ref 24–34)
MCHC RBC AUTO-ENTMCNC: 34.7 G/DL (ref 31–37)
MCV RBC AUTO: 88.5 FL (ref 74–97)
MONOCYTES # BLD: 0.6 K/UL (ref 0.05–1.2)
MONOCYTES NFR BLD: 9 % (ref 3–10)
NEUTS SEG # BLD: 3.1 K/UL (ref 1.8–8)
NEUTS SEG NFR BLD: 45 % (ref 40–73)
NITRITE UR QL STRIP.AUTO: NEGATIVE
P-R INTERVAL, ECG05: 174 MS
PH UR STRIP: 7.5 [PH] (ref 5–8)
PLATELET # BLD AUTO: 239 K/UL (ref 135–420)
PMV BLD AUTO: 10.4 FL (ref 9.2–11.8)
POTASSIUM SERPL-SCNC: 3.9 MMOL/L (ref 3.5–5.5)
PROT SERPL-MCNC: 7.8 G/DL (ref 6.4–8.2)
PROT UR STRIP-MCNC: NEGATIVE MG/DL
PROTHROMBIN TIME: 12.8 SEC (ref 11.5–15.2)
Q-T INTERVAL, ECG07: 384 MS
QRS DURATION, ECG06: 82 MS
QTC CALCULATION (BEZET), ECG08: 417 MS
RBC # BLD AUTO: 4.95 M/UL (ref 4.7–5.5)
SODIUM SERPL-SCNC: 140 MMOL/L (ref 136–145)
SP GR UR REFRACTOMETRY: 1.02 (ref 1–1.03)
UROBILINOGEN UR QL STRIP.AUTO: 1 EU/DL (ref 0.2–1)
VENTRICULAR RATE, ECG03: 71 BPM
WBC # BLD AUTO: 6.9 K/UL (ref 4.6–13.2)

## 2020-10-24 PROCEDURE — 83735 ASSAY OF MAGNESIUM: CPT

## 2020-10-24 PROCEDURE — 85610 PROTHROMBIN TIME: CPT

## 2020-10-24 PROCEDURE — 93005 ELECTROCARDIOGRAM TRACING: CPT

## 2020-10-24 PROCEDURE — 94762 N-INVAS EAR/PLS OXIMTRY CONT: CPT

## 2020-10-24 PROCEDURE — 81003 URINALYSIS AUTO W/O SCOPE: CPT

## 2020-10-24 PROCEDURE — 74011250636 HC RX REV CODE- 250/636: Performed by: EMERGENCY MEDICINE

## 2020-10-24 PROCEDURE — 99285 EMERGENCY DEPT VISIT HI MDM: CPT

## 2020-10-24 PROCEDURE — 96375 TX/PRO/DX INJ NEW DRUG ADDON: CPT

## 2020-10-24 PROCEDURE — 77010033678 HC OXYGEN DAILY

## 2020-10-24 PROCEDURE — 70450 CT HEAD/BRAIN W/O DYE: CPT

## 2020-10-24 PROCEDURE — 80053 COMPREHEN METABOLIC PANEL: CPT

## 2020-10-24 PROCEDURE — 96365 THER/PROPH/DIAG IV INF INIT: CPT

## 2020-10-24 PROCEDURE — 85025 COMPLETE CBC W/AUTO DIFF WBC: CPT

## 2020-10-24 PROCEDURE — 71045 X-RAY EXAM CHEST 1 VIEW: CPT

## 2020-10-24 RX ORDER — DEXAMETHASONE 4 MG/1
4 TABLET ORAL EVERY 12 HOURS
Status: DISCONTINUED | OUTPATIENT
Start: 2020-10-24 | End: 2020-10-24 | Stop reason: HOSPADM

## 2020-10-24 RX ORDER — MIDAZOLAM HYDROCHLORIDE 1 MG/ML
4 INJECTION, SOLUTION INTRAMUSCULAR; INTRAVENOUS ONCE
Status: COMPLETED | OUTPATIENT
Start: 2020-10-24 | End: 2020-10-24

## 2020-10-24 RX ORDER — LORAZEPAM 2 MG/ML
2 INJECTION INTRAMUSCULAR
Status: COMPLETED | OUTPATIENT
Start: 2020-10-24 | End: 2020-10-24

## 2020-10-24 RX ORDER — LEVETIRACETAM 1000 MG/1
1000 TABLET ORAL 2 TIMES DAILY
Qty: 60 TAB | Refills: 0 | Status: ON HOLD | OUTPATIENT
Start: 2020-10-24 | End: 2020-10-28 | Stop reason: SDUPTHER

## 2020-10-24 RX ORDER — DEXAMETHASONE 4 MG/1
4 TABLET ORAL 2 TIMES DAILY WITH MEALS
Qty: 60 TAB | Refills: 0 | Status: SHIPPED | OUTPATIENT
Start: 2020-10-24 | End: 2020-11-23

## 2020-10-24 RX ORDER — LEVETIRACETAM 10 MG/ML
1000 INJECTION INTRAVASCULAR EVERY 12 HOURS
Status: DISCONTINUED | OUTPATIENT
Start: 2020-10-24 | End: 2020-10-24 | Stop reason: HOSPADM

## 2020-10-24 RX ADMIN — MIDAZOLAM 4 MG: 1 INJECTION INTRAMUSCULAR; INTRAVENOUS at 11:00

## 2020-10-24 RX ADMIN — LORAZEPAM 2 MG: 2 INJECTION INTRAMUSCULAR; INTRAVENOUS at 10:46

## 2020-10-24 RX ADMIN — DEXAMETHASONE 4 MG: 4 TABLET ORAL at 14:45

## 2020-10-24 RX ADMIN — LEVETIRACETAM 1000 MG: 1000 INJECTION, SOLUTION INTRAVENOUS at 11:00

## 2020-10-24 NOTE — ED NOTES
Dr Sina Amador at bedside to examine patient pt with spasms intermittently pt awake with eyes open when asked questions patient answers are mumbled and unclear

## 2020-10-24 NOTE — ED NOTES
5:14 PM Pt reevaluated at this time. Discussed results and findings, as well as, diagnosis and plan for discharge. Follow up with doctors/services listed. Return to the emergency department for alarming symptoms. Pt verbalizes understanding and agreement with plan. All questions addressed. Patient is alert feeding himself. Some coordination difficulties. His speech is fine. Discussed at length with his wife, she does have son to help her out. She is worried that he is a little bit belligerent with the use of steroids. Discussed for new or alarming problems return to the emergency department. Close follow-up with oncology and neurosurgery. She is agreeable with this plan. Encounter Diagnoses     ICD-10-CM ICD-9-CM   1.  [facility]  G40.909 345.80

## 2020-10-24 NOTE — PROGRESS NOTES
responded to a Code S for  Carmina Diane, who is a 77 y.o.,male,     The  provided the following Interventions:  Provided crisis spiritual care and support. Offered prayers on behalf for the patient. Chart reviewed. The following outcomes were achieved:      Assessment:  There are no further spiritual or Jain issues which require intervention at this time. Plan:  Chaplains will continue to follow and will provide spiritual care as needed.  recommends bedside caregivers page  on duty if patient or family shows signs of acute spiritual or emotional distress.        [facility], [address] [facility]  [phone]

## 2020-10-24 NOTE — DISCHARGE INSTRUCTIONS
Return patient to the emergency department for any new or alarming or unmanageable symptoms. Call oncologist and neurosurgeon first thing on Monday to discuss.

## 2020-10-24 NOTE — ED NOTES
Dr Domenic Taylor spoke with patient's wife and family members via phone.  Updated on patient's status and plan

## 2020-10-24 NOTE — ED PROVIDER NOTES
EMERGENCY DEPARTMENT HISTORY AND PHYSICAL EXAM  This was created with voice recognition software and transcription errors may be present. 10:45 AM  Date: 10/24/2020  Patient Name: Emiliano James    History of Presenting Illness     Chief Complaint:    History Provided By:     HPI: Emiliano James is a 77 y.o. male with a past medical history history of pain pancreatitis intracranial tumor seizures on Keppra who presents with seizure. Patient reportedly had a seizure at home and was complaining of right arm spasming and cramping and brought in by [facility]. On arrival the patient is having a localized face and right upper extremity seizure.   History is limited at this time    PCP: Ashli Bustamante MD      Past History     Past Medical History:  Past Medical History:   Diagnosis Date    Abuse     h/o alcohol abstinent since 1/13    Asbestosis(501) 1997    dx'ed in [place], f/u xrays negative    Bilateral hearing loss     Cervical spinal stenosis 08/2016    Dt Ruth; severe C5-6 on mri    Chronic pain     DIRE score 18 from 5/16; no response gabapentin/lyrica/topamax/cymbalta; controlled substance agreement in place    Chronic prostatitis     Dr. Horvath Justice COPD 7/11    on CXR    DJD (degenerative joint disease)     c spine on mri 2004, t spine mri 10/13; saw Dr Galo; intol lyrica, topamax and cymbalta; no response to gabapentin    Epididymitis     recurrent Dr Carlita Ruiz 10/14; also gb polyps, no stones    FHx: heart disease     GERD (gastroesophageal reflux disease)     h/o gastritis; s/p dilation 8/14 Dr Raven Early    Hyperlipidemia     calculated 10 year risk score 12.6% (2014)    Hypovitaminosis D 7/14    Microscopic hematuria     Pancreatitis     10/14 from binge alcohol drinking; on CT 10/14 [facility]    PUD (peptic ulcer disease) 8/14    antral ulcers, gastritis, gr 3 esophagitis, esoph ring s/p dilation, neg h pylori Dr Raven Early    Recurrent kidney stones     2006 Dr. Patricia Contreras;        Past Surgical History:  Past Surgical History:   Procedure Laterality Date   [address]    thallium negative    HX CATARACT REMOVAL Bilateral 2012    HX COLONOSCOPY      Dr. Elvie Mathur 2005 negative    HX HEMORRHOIDECTOMY  2005    Dr. Asad Licea HX ORTHOPAEDIC  2006    left knee surgery/meniscus/patella in [place], [place] workmans comp    HX UROLOGICAL  4/15    [facility], Dr. Veronica Reese, Cystoscopy, left retrograde ureteroscopy, holmium laser lithotripsy, double j catheter        Family History:  Family History   Problem Relation Age of Onset    Hypertension Mother     Diabetes Father     Heart Disease Father        Social History:  Social History     Tobacco Use    Smoking status: Former Smoker     Packs/day: 1.00     Last attempt to quit: 2011     Years since quittin.8    Smokeless tobacco: Former User   Substance Use Topics    Alcohol use: Yes     Comment: socially    Drug use: No       Allergies: Allergies   Allergen Reactions    Benadryl [Diphenhydramine Hcl] Other (comments)     Patient states he becomes hyper      Cymbalta [Duloxetine] Other (comments)    Flexeril [Cyclobenzaprine] Other (comments)     Felt bad      Gabapentin Swelling    Lipitor [Atorvastatin] Myalgia    Lyrica [Pregabalin] Other (comments)     Weight gain    Nexium [Esomeprazole Magnesium] Other (comments)     Dry eyes    Pamelor [Nortriptyline] Hives    Pravastatin Myalgia    Septra [Sulfamethoprim Ds] Other (comments)     Agitation      Sinequan [Doxepin] Other (comments)     Irritable      Topamax [Topiramate] Other (comments)     Possible kidney stones? Review of Systems     Review of Systems   Unable to perform ROS: Acuity of condition     10 point review of systems otherwise negative unless noted in HPI.     Physical Exam       Physical Exam  Constitutional:       Comments: Patient is currently having a focal seizure involving his face and right arm   HENT: Head: Normocephalic and atraumatic. Nose: Nose normal.      Mouth/Throat:      Mouth: Mucous membranes are moist.   Neck:      Musculoskeletal: Normal range of motion. Cardiovascular:      Rate and Rhythm: Normal rate. Pulses: Normal pulses. Pulmonary:      Effort: Pulmonary effort is normal.   Abdominal:      General: Abdomen is flat. Musculoskeletal: Normal range of motion. Skin:     General: Skin is warm. Neurological:      Comments: Patient is having a focal seizure otherwise limited evaluation at this time   Psychiatric:      Comments: Not testable         Diagnostic Study Results     Vital Signs   Visit Vitals  /75   Pulse 70   Resp 22   Wt 79.4 kg (175 lb)   SpO2 97%   BMI 25.84 kg/m²      EKG:  Labs: CBC is unremarkable INR normal chemistry unremarkable  Imaging:   IMPRESSION:     1. No findings of acute intracranial hemorrhage or large territory acute  cortical infarct. 2. Postoperative changes from previous left craniotomy for resection of  frontoparietal glioblastoma.  -Heterogeneous soft tissue density in the region of the tumor resection site may  represent residual/recurrent tumor.  -The surrounding low-attenuation vasogenic edema has slightly increased compared  to 9/8/2020 brain MRI. 3. Trace amount of rightward shift of midline. No hydrocephalus or ventricular  trapping.     Findings were discussed with Dr. Hattie Edward prior to dictation, 11:35 AM, 10/24/2020. IMPRESSION:      Mild elevation of the left hemidiaphragm with subjacent bowel gas noted, new  compared to prior, with adjacent patchy left basilar lung opacity consistent  with atelectasis versus infiltrate.       Medical Decision Making     ED Course: Progress Notes, Reevaluation, and Consults:      Provider Notes (Medical Decision Making): This is a 30-year-old gentleman presenting with a focal seizure.   He is already on Keppra we will give him 2 mg of Ativan here obtain a CT head to ensure no intracranial hemorrhage and discussed with teleneurology. Discussed the case with teleneurology recommend increasing the Keppra from 750 daily to 1000 twice daily. Also discussed the case with Dr. Toni Allen from oncology who states to continue the patient's chemo tonight and start him on steroids 4 mg of Decadron twice daily    Discussed the case with neurosurgery at [facility] who agree as long as the patient has baseline neuro normal then he is okay to follow-up as an outpatient. As noted the patient had no symptoms of pneumonia prior it is possible but doubtful reassess him once he is awake he was also sedated after getting multiple doses of Ativan and Versed given his ongoing seizure I suspect atelectasis      Patient turned over to Dr. Alberta Buckley pending dispo         Diagnosis     Clinical Impression: No diagnosis found. Disposition:    Patient's Medications   Start Taking    No medications on file   Continue Taking    LEVETIRACETAM (KEPPRA) 750 MG TABLET    daily. TAMSULOSIN (FLOMAX) 0.4 MG CAPSULE    Take 0.4 mg by mouth nightly. TAMSULOSIN (FLOMAX) 0.4 MG CAPSULE    Take 1 Cap by mouth daily (after dinner).    These Medications have changed    No medications on file   Stop Taking    No medications on file

## 2020-10-26 ENCOUNTER — HOSPITAL ENCOUNTER (INPATIENT)
Age: 75
LOS: 2 days | Discharge: HOME | DRG: 100 | End: 2020-10-28
Attending: EMERGENCY MEDICINE | Admitting: EMERGENCY MEDICINE
Payer: MEDICARE

## 2020-10-26 ENCOUNTER — APPOINTMENT (OUTPATIENT)
Dept: MRI IMAGING | Age: 75
DRG: 100 | End: 2020-10-26
Attending: EMERGENCY MEDICINE
Payer: MEDICARE

## 2020-10-26 ENCOUNTER — APPOINTMENT (OUTPATIENT)
Dept: CT IMAGING | Age: 75
DRG: 100 | End: 2020-10-26
Attending: EMERGENCY MEDICINE
Payer: MEDICARE

## 2020-10-26 DIAGNOSIS — D49.6 BRAIN TUMOR (HCC): ICD-10-CM

## 2020-10-26 DIAGNOSIS — G93.6 VASOGENIC BRAIN EDEMA (HCC): ICD-10-CM

## 2020-10-26 DIAGNOSIS — G40.909 SEIZURE DISORDER (HCC): Primary | ICD-10-CM

## 2020-10-26 DIAGNOSIS — G40.919 BREAKTHROUGH SEIZURE (HCC): ICD-10-CM

## 2020-10-26 DIAGNOSIS — R56.9 PARTIAL SEIZURES (HCC): ICD-10-CM

## 2020-10-26 DIAGNOSIS — R29.898 RIGHT ARM WEAKNESS: ICD-10-CM

## 2020-10-26 DIAGNOSIS — R26.81 GAIT INSTABILITY: ICD-10-CM

## 2020-10-26 PROBLEM — C71.9 BRAIN CANCER (HCC): Status: ACTIVE | Noted: 2020-10-26

## 2020-10-26 PROBLEM — R26.9 GAIT DISTURBANCE: Status: ACTIVE | Noted: 2020-10-26

## 2020-10-26 LAB
ALBUMIN SERPL-MCNC: 3.9 G/DL (ref 3.4–5)
ALBUMIN/GLOB SERPL: 1.1 {RATIO} (ref 0.8–1.7)
ALP SERPL-CCNC: 92 U/L (ref 45–117)
ALT SERPL-CCNC: 37 U/L (ref 16–61)
ANION GAP SERPL CALC-SCNC: 6 MMOL/L (ref 3–18)
AST SERPL-CCNC: 47 U/L (ref 10–38)
ATRIAL RATE: 56 BPM
BASOPHILS # BLD: 0 K/UL (ref 0–0.1)
BASOPHILS NFR BLD: 0 % (ref 0–2)
BILIRUB SERPL-MCNC: 0.4 MG/DL (ref 0.2–1)
BNP SERPL-MCNC: 13 PG/ML (ref 0–900)
BUN SERPL-MCNC: 15 MG/DL (ref 7–18)
BUN/CREAT SERPL: 21 (ref 12–20)
CALCIUM SERPL-MCNC: 9.4 MG/DL (ref 8.5–10.1)
CALCULATED P AXIS, ECG09: 65 DEGREES
CALCULATED R AXIS, ECG10: 62 DEGREES
CALCULATED T AXIS, ECG11: 62 DEGREES
CHLORIDE SERPL-SCNC: 107 MMOL/L (ref 100–111)
CO2 SERPL-SCNC: 24 MMOL/L (ref 21–32)
CREAT SERPL-MCNC: 0.73 MG/DL (ref 0.6–1.3)
DIAGNOSIS, 93000: NORMAL
DIFFERENTIAL METHOD BLD: ABNORMAL
EOSINOPHIL # BLD: 0 K/UL (ref 0–0.4)
EOSINOPHIL NFR BLD: 0 % (ref 0–5)
ERYTHROCYTE [DISTWIDTH] IN BLOOD BY AUTOMATED COUNT: 15.1 % (ref 11.6–14.5)
GLOBULIN SER CALC-MCNC: 3.6 G/DL (ref 2–4)
GLUCOSE SERPL-MCNC: 108 MG/DL (ref 74–99)
HCT VFR BLD AUTO: 39 % (ref 36–48)
HGB BLD-MCNC: 13.1 G/DL (ref 13–16)
INR PPP: 0.9 (ref 0.8–1.2)
LIPASE SERPL-CCNC: 85 U/L (ref 73–393)
LYMPHOCYTES # BLD: 0.5 K/UL (ref 0.9–3.6)
LYMPHOCYTES NFR BLD: 12 % (ref 21–52)
MAGNESIUM SERPL-MCNC: 2.2 MG/DL (ref 1.6–2.6)
MCH RBC QN AUTO: 29.6 PG (ref 24–34)
MCHC RBC AUTO-ENTMCNC: 33.6 G/DL (ref 31–37)
MCV RBC AUTO: 88 FL (ref 74–97)
MONOCYTES # BLD: 0.2 K/UL (ref 0.05–1.2)
MONOCYTES NFR BLD: 4 % (ref 3–10)
NEUTS SEG # BLD: 3.7 K/UL (ref 1.8–8)
NEUTS SEG NFR BLD: 84 % (ref 40–73)
P-R INTERVAL, ECG05: 160 MS
PLATELET # BLD AUTO: 209 K/UL (ref 135–420)
PMV BLD AUTO: 10.4 FL (ref 9.2–11.8)
POTASSIUM SERPL-SCNC: 4.5 MMOL/L (ref 3.5–5.5)
PROT SERPL-MCNC: 7.5 G/DL (ref 6.4–8.2)
PROTHROMBIN TIME: 12.3 SEC (ref 11.5–15.2)
Q-T INTERVAL, ECG07: 412 MS
QRS DURATION, ECG06: 88 MS
QTC CALCULATION (BEZET), ECG08: 397 MS
RBC # BLD AUTO: 4.43 M/UL (ref 4.7–5.5)
SODIUM SERPL-SCNC: 137 MMOL/L (ref 136–145)
TROPONIN I SERPL-MCNC: <0.02 NG/ML (ref 0–0.04)
VENTRICULAR RATE, ECG03: 56 BPM
WBC # BLD AUTO: 4.4 K/UL (ref 4.6–13.2)

## 2020-10-26 PROCEDURE — 80053 COMPREHEN METABOLIC PANEL: CPT

## 2020-10-26 PROCEDURE — 83735 ASSAY OF MAGNESIUM: CPT

## 2020-10-26 PROCEDURE — 93005 ELECTROCARDIOGRAM TRACING: CPT

## 2020-10-26 PROCEDURE — 96374 THER/PROPH/DIAG INJ IV PUSH: CPT

## 2020-10-26 PROCEDURE — 95816 EEG AWAKE AND DROWSY: CPT | Performed by: EMERGENCY MEDICINE

## 2020-10-26 PROCEDURE — 99284 EMERGENCY DEPT VISIT MOD MDM: CPT

## 2020-10-26 PROCEDURE — 83690 ASSAY OF LIPASE: CPT

## 2020-10-26 PROCEDURE — 70450 CT HEAD/BRAIN W/O DYE: CPT

## 2020-10-26 PROCEDURE — 85610 PROTHROMBIN TIME: CPT

## 2020-10-26 PROCEDURE — 74011250636 HC RX REV CODE- 250/636

## 2020-10-26 PROCEDURE — 65660000000 HC RM CCU STEPDOWN

## 2020-10-26 PROCEDURE — 85025 COMPLETE CBC W/AUTO DIFF WBC: CPT

## 2020-10-26 PROCEDURE — 99223 1ST HOSP IP/OBS HIGH 75: CPT | Performed by: EMERGENCY MEDICINE

## 2020-10-26 PROCEDURE — 74011250636 HC RX REV CODE- 250/636: Performed by: EMERGENCY MEDICINE

## 2020-10-26 PROCEDURE — 83880 ASSAY OF NATRIURETIC PEPTIDE: CPT

## 2020-10-26 PROCEDURE — 84484 ASSAY OF TROPONIN QUANT: CPT

## 2020-10-26 PROCEDURE — 2709999900 HC NON-CHARGEABLE SUPPLY

## 2020-10-26 PROCEDURE — 70553 MRI BRAIN STEM W/O & W/DYE: CPT

## 2020-10-26 PROCEDURE — 74011636320 HC RX REV CODE- 636/320: Performed by: EMERGENCY MEDICINE

## 2020-10-26 PROCEDURE — A9575 INJ GADOTERATE MEGLUMI 0.1ML: HCPCS | Performed by: EMERGENCY MEDICINE

## 2020-10-26 RX ORDER — SODIUM CHLORIDE 0.9 % (FLUSH) 0.9 %
5-40 SYRINGE (ML) INJECTION AS NEEDED
Status: DISCONTINUED | OUTPATIENT
Start: 2020-10-26 | End: 2020-10-28 | Stop reason: HOSPADM

## 2020-10-26 RX ORDER — TAMSULOSIN HYDROCHLORIDE 0.4 MG/1
0.4 CAPSULE ORAL
Status: DISCONTINUED | OUTPATIENT
Start: 2020-10-26 | End: 2020-10-28 | Stop reason: HOSPADM

## 2020-10-26 RX ORDER — LORAZEPAM 2 MG/ML
INJECTION INTRAMUSCULAR
Status: COMPLETED
Start: 2020-10-26 | End: 2020-10-26

## 2020-10-26 RX ORDER — DEXTROSE 50 % IN WATER (D50W) INTRAVENOUS SYRINGE
25-50 AS NEEDED
Status: DISCONTINUED | OUTPATIENT
Start: 2020-10-26 | End: 2020-10-28 | Stop reason: HOSPADM

## 2020-10-26 RX ORDER — ACETAMINOPHEN 650 MG/1
650 SUPPOSITORY RECTAL
Status: DISCONTINUED | OUTPATIENT
Start: 2020-10-26 | End: 2020-10-28 | Stop reason: HOSPADM

## 2020-10-26 RX ORDER — INSULIN LISPRO 100 [IU]/ML
INJECTION, SOLUTION INTRAVENOUS; SUBCUTANEOUS
Status: DISCONTINUED | OUTPATIENT
Start: 2020-10-26 | End: 2020-10-28 | Stop reason: HOSPADM

## 2020-10-26 RX ORDER — LORAZEPAM 2 MG/ML
1 INJECTION INTRAMUSCULAR
Status: DISCONTINUED | OUTPATIENT
Start: 2020-10-26 | End: 2020-10-28

## 2020-10-26 RX ORDER — FAMOTIDINE 20 MG/1
20 TABLET, FILM COATED ORAL DAILY
Status: DISCONTINUED | OUTPATIENT
Start: 2020-10-27 | End: 2020-10-28 | Stop reason: HOSPADM

## 2020-10-26 RX ORDER — LORAZEPAM 2 MG/ML
1 INJECTION INTRAMUSCULAR ONCE
Status: COMPLETED | OUTPATIENT
Start: 2020-10-26 | End: 2020-10-26

## 2020-10-26 RX ORDER — MAGNESIUM SULFATE 100 %
4 CRYSTALS MISCELLANEOUS AS NEEDED
Status: DISCONTINUED | OUTPATIENT
Start: 2020-10-26 | End: 2020-10-28 | Stop reason: HOSPADM

## 2020-10-26 RX ORDER — DEXAMETHASONE SODIUM PHOSPHATE 4 MG/ML
4 INJECTION, SOLUTION INTRA-ARTICULAR; INTRALESIONAL; INTRAMUSCULAR; INTRAVENOUS; SOFT TISSUE
Status: COMPLETED | OUTPATIENT
Start: 2020-10-26 | End: 2020-10-26

## 2020-10-26 RX ORDER — ONDANSETRON 2 MG/ML
4 INJECTION INTRAMUSCULAR; INTRAVENOUS
Status: DISCONTINUED | OUTPATIENT
Start: 2020-10-26 | End: 2020-10-28 | Stop reason: HOSPADM

## 2020-10-26 RX ORDER — PROMETHAZINE HYDROCHLORIDE 25 MG/1
12.5 TABLET ORAL
Status: DISCONTINUED | OUTPATIENT
Start: 2020-10-26 | End: 2020-10-28 | Stop reason: HOSPADM

## 2020-10-26 RX ORDER — POLYETHYLENE GLYCOL 3350 17 G/17G
17 POWDER, FOR SOLUTION ORAL DAILY PRN
Status: DISCONTINUED | OUTPATIENT
Start: 2020-10-26 | End: 2020-10-28 | Stop reason: HOSPADM

## 2020-10-26 RX ORDER — SODIUM CHLORIDE 0.9 % (FLUSH) 0.9 %
5-40 SYRINGE (ML) INJECTION EVERY 8 HOURS
Status: DISCONTINUED | OUTPATIENT
Start: 2020-10-26 | End: 2020-10-28 | Stop reason: HOSPADM

## 2020-10-26 RX ORDER — DEXAMETHASONE SODIUM PHOSPHATE 4 MG/ML
4 INJECTION, SOLUTION INTRA-ARTICULAR; INTRALESIONAL; INTRAMUSCULAR; INTRAVENOUS; SOFT TISSUE EVERY 6 HOURS
Status: DISCONTINUED | OUTPATIENT
Start: 2020-10-26 | End: 2020-10-28

## 2020-10-26 RX ORDER — ACETAMINOPHEN 325 MG/1
650 TABLET ORAL
Status: DISCONTINUED | OUTPATIENT
Start: 2020-10-26 | End: 2020-10-28 | Stop reason: HOSPADM

## 2020-10-26 RX ADMIN — DEXAMETHASONE SODIUM PHOSPHATE 4 MG: 4 INJECTION, SOLUTION INTRAMUSCULAR; INTRAVENOUS at 13:26

## 2020-10-26 RX ADMIN — LORAZEPAM 1 MG: 2 INJECTION INTRAMUSCULAR at 16:10

## 2020-10-26 RX ADMIN — LORAZEPAM 1 MG: 2 INJECTION INTRAMUSCULAR; INTRAVENOUS at 16:10

## 2020-10-26 RX ADMIN — GADOTERATE MEGLUMINE 16 ML: 376.9 INJECTION INTRAVENOUS at 19:00

## 2020-10-26 NOTE — H&P
Hospitalist Admission History and Physical    NAME:  Radha Carreno   :      MRN:   [id number]     PCP:  None  Date/Time:  10/26/2020   Subjective:   CHIEF COMPLAINT: Seizures    HISTORY OF PRESENT ILLNESS:     This is a 81-year-old male with past medical history of glioblastoma multiform who underwent resection in 2020 at [facility] who presented to the ED with complaints of recurrent seizures. Patient has been on [address] since his surgery but last week had some seizures and was evaluated in the ED and was discharged home with increased dose of Keppra. Today patient had another seizure which was witnessed by his daughter. Patient did not lose consciousness but had shaking of his upper extremities and his body became very rigid. No loss of bowel or bladder control. History is obtained from talking to the patient as well as talking to his wife and daughter on the phone. As per patient's wife and daughter patient has had some residual right sided weakness since the resection and also has had some mild gait instability but both of these symptoms have gotten worse over the last week. No history of any fever or chills. No history of any nausea or vomiting. No history of any cough. No history of any sick contact. No history of any chest pain or shortness of breath. No history of any headaches or numbness. No history of any abdominal pain urinary complaints diarrhea or rectal bleeding. No history of any black or dark-colored stools. While I was interviewing and examining the patient patient had a similar episode where his body became rigid and started turning towards the left side and he started having some shaking of his upper extremities. Patient was given dose of IV Ativan. Episode lasted less than a minute and had resolved prior to patient receiving the Ativan.   I also noted that patient is hard of hearing which apparently he has been for many years as per the wife and daughter.         Past Medical History:   Diagnosis Date    Abuse     h/o alcohol abstinent since     Asbestosis(501)     dx'ed in [place], f/u xrays negative    Bilateral hearing loss     Cervical spinal stenosis 2016    Dt [facility]; severe C5-6 on mri    Chronic pain     DIRE score 18 from ; no response gabapentin/lyrica/topamax/cymbalta; controlled substance agreement in place    Chronic prostatitis     Dr. Zoe Kim COPD     on CXR    DJD (degenerative joint disease)     c spine on mri , t spine mri 10/13; saw Dr Zack Temple; intol lyrica, topamax and cymbalta; no response to gabapentin    Epididymitis     recurrent Dr Prachi Stephen 10/14; also gb polyps, no stones    FHx: heart disease     GERD (gastroesophageal reflux disease)     h/o gastritis; s/p dilation  Dr Eunice Gramajo    Hyperlipidemia     calculated 10 year risk score 12.6% ()    Hypovitaminosis D     Microscopic hematuria     Pancreatitis     10/14 from binge alcohol drinking; on CT 10/14 [place]    PUD (peptic ulcer disease)     antral ulcers, gastritis, gr 3 esophagitis, esoph ring s/p dilation, neg h pylori Dr Eunice Gramajo    Recurrent kidney stones      Dr. Ian Kim;         Past Surgical History:   Procedure Laterality Date   [address]    thallium negative    HX CATARACT REMOVAL Bilateral 2012    HX COLONOSCOPY      Dr. Avani Celestin  negative    HX HEMORRHOIDECTOMY  2005    Dr. Cooper Been      left knee surgery/meniscus/patella in [place], [place] workmans comp    HX UROLOGICAL  4/15    [facility], Dr. Elle Gee, Cystoscopy, left retrograde ureteroscopy, holmium laser lithotripsy, double j catheter        Social History     Tobacco Use    Smoking status: Former Smoker     Packs/day: 1.00     Last attempt to quit: 2011     Years since quittin.8    Smokeless tobacco: Former User   Substance Use Topics    Alcohol use: Yes     Comment: socially        Family History   Problem Relation Age of Onset    Hypertension Mother     Diabetes Father     Heart Disease Father         Allergies   Allergen Reactions    Benadryl [Diphenhydramine Hcl] Other (comments)     Patient states he becomes hyper      Cymbalta [Duloxetine] Other (comments)    Flexeril [Cyclobenzaprine] Other (comments)     Felt bad      Gabapentin Swelling    Lipitor [Atorvastatin] Myalgia    Lyrica [Pregabalin] Other (comments)     Weight gain    Nexium [Esomeprazole Magnesium] Other (comments)     Dry eyes    Pamelor [Nortriptyline] Hives    Pravastatin Myalgia    Septra [Sulfamethoprim Ds] Other (comments)     Agitation      Sinequan [Doxepin] Other (comments)     Irritable      Topamax [Topiramate] Other (comments)     Possible kidney stones? Prior to Admission Medications   Prescriptions Last Dose Informant Patient Reported? Taking?   dexAMETHasone (Decadron) 4 mg tablet   No No   Sig: Take 4 mg by mouth two (2) times daily (with meals) for 30 days. levETIRAcetam (Keppra) 1,000 mg tablet   No No   Sig: Take 1 Tab by mouth two (2) times a day for 30 days. tamsulosin (FLOMAX) 0.4 mg capsule   Yes No   Sig: Take 0.4 mg by mouth nightly. tamsulosin (FLOMAX) 0.4 mg capsule   No No   Sig: Take 1 Cap by mouth daily (after dinner). Facility-Administered Medications: None       REVIEW OF SYSTEMS:    Review of Systems  GENERAL: Patient is awake and alert and able to communicate in full sentences  HEENT: No change in vision, no earache, tinnitus, sore throat or sinus congestion. NECK: No pain or stiffness. PULMONARY: No shortness of breath, cough or wheeze. Cardiovascular: no pnd or orthopnea, no CP  GASTROINTESTINAL: No abdominal pain, nausea, vomiting or diarrhea, melena or bright red blood per rectum. GENITOURINARY: No urinary frequency, urgency, hesitancy or dysuria.    MUSCULOSKELETAL: No back pain, no leg pain  DERMATOLOGIC: No rash, no itching, no lesions. ENDOCRINE: No polyuria, polydipsia, no heat or cold intolerance. No recent change in weight. HEMATOLOGICAL: No anemia or easy bruising or bleeding. NEUROLOGIC: No headache, or numbness or tingling. Patient has some right-sided weakness      Objective:   VITALS:    Visit Vitals  /75 (BP 1 Location: Left arm, BP Patient Position: At rest)   Pulse 62   Temp 97.6 °F (36.4 °C)   Resp 16   SpO2 99%     Temp (24hrs), Av.6 °F (36.4 °C), Min:97.6 °F (36.4 °C), Max:97.6 °F (36.4 °C)      PHYSICAL EXAM:   General:    Sitting in bed in no acute distress. HEENT:  Pupils equal.  Sclera anicteric. Conjunctiva pink. Mucous membranes                           Moist, no ear or nasal discharge  Neck:  Supple. Trachea midline. No accessory muscle use. No thyromegaly. No jugular venous distention, no carotid bruit  CV:                  Regular rate and rhythm. S1S2+  Lungs:   Clear to auscultation bilaterally. No Wheezing or Rhonchi. No rales. Abdomen:   Soft, non-tender. Not distended. Bowel sounds normal.   Extremities: No cyanosis. No edema. Pulses 1+ b/l  Neurologic: Patient is awake and alert. Patient is hard of hearing. Follows commands, responds appropriately. Patient has some right-sided weakness  Skin:                Warm and dry. No rashes.          LAB DATA REVIEWED:    No components found for: [place]  Recent Labs     10/26/20  1100 10/26/20  1015 10/24/20  1046   NA  --  137 140   K  --  4.5 3.9   CL  --  107 107   CO2  --  24 26   BUN  --  15 14   CREA  --  0.73 0.70   GLU  --  108* 109*   CA  --  9.4 9.9   ALB  --  3.9 4.1   WBC 4.4*  --  6.9   HGB 13.1  --  15.2   HCT 39.0  --  43.8     --  239             CBC WITH AUTOMATED DIFF    Collection Time: 10/26/20 11:00 AM   Result Value Ref Range    WBC 4.4 (L) 4.6 - 13.2 K/uL    RBC 4.43 (L) 4.70 - 5.50 M/uL    HGB 13.1 13.0 - 16.0 g/dL    HCT 39.0 36.0 - 48.0 %    MCV 88.0 74.0 - 97.0 FL    MCH 29.6 24.0 - 34.0 PG    MCHC 33.6 31.0 - 37.0 g/dL    RDW 15.1 (H) 11.6 - 14.5 %    PLATELET 703 280 - 940 K/uL    MPV 10.4 9.2 - 11.8 FL    NEUTROPHILS 84 (H) 40 - 73 %    LYMPHOCYTES 12 (L) 21 - 52 %    MONOCYTES 4 3 - 10 %    EOSINOPHILS 0 0 - 5 %    BASOPHILS 0 0 - 2 %    ABS. NEUTROPHILS 3.7 1.8 - 8.0 K/UL    ABS. LYMPHOCYTES 0.5 (L) 0.9 - 3.6 K/UL    ABS. MONOCYTES 0.2 0.05 - 1.2 K/UL    ABS. EOSINOPHILS 0.0 0.0 - 0.4 K/UL    ABS. BASOPHILS 0.0 0.0 - 0.1 K/UL    DF AUTOMATED         CT Results  (Last 48 hours)               10/26/20 1147  CT HEAD WO CONT Final result    Impression:  Impression:       No significant interval change from 2 days prior. Vasogenic edema and/or infiltrative tumor throughout the left cerebral convexity   which appears similar in extent to head CT from 2 days prior, but has advanced   since prior MRI on September 8. Soft tissue density within the region of vasogenic edema may represent residual   or recurrent tumor. Similar trace rightward midline shift. No large territory acute infarct or acute bleed. Narrative:  CT brain without enhancement        CPT code: 98223       Indication: Seizure. Gait instability. Technique: Unenhanced 5 mm collimation axial images obtained from the skull base   through the vertex. Dose reduction techniques used: Automated exposure control, adjustment of the   mAs and/or kVp according to patient size, standardized low-dose protocol, and/or   iterative reconstruction technique. Comparison: October 24, 2020. Findings: Vasogenic edema and/or infiltrative tumor in the left cerebral   convexity is again seen. Compared to head CT from 2 days prior, this appears   relatively similar in extent. Heterogeneous soft tissue posteriorly in this   region may also represent recurrent tumor and appears stable. There is no new   hemorrhage and no new infarct. Trace rightward midline shift is again seen. This   is stable.  Chronic dural thickening along the right cerebral convexity is again   seen. Chronic extra dural blood is again seen. Sinuses: Clear. Orbits: Normal.   Calvarium: Prior left-sided craniotomy. Assessment/Plan:      Active Problems:    Partial seizures ([address] (10/26/2020)      Gait disturbance (10/26/2020)      Brain cancer ([address] (10/26/2020)      Seizures ([address] (10/26/2020)    Recurrent seizures  Glioblastoma multiformepatient underwent resection in February 2020 and has since been receiving chemo with Dr. Vazquez Maurer with [facility]  Abnormal CT scan of the head  Vasogenic edema  Hard of hearing  ___________________________________________________  PLAN:    The ED physician called and discussed with neurosurgery as well as neurology as well as hospitalist at [facility] and at this time the neurosurgeon recommended that patient be started on Decadron 4 mg IV every 6 hours and the neurologist recommended to increase the dose of patient's Keppra to 1250 mg every 12 hours. They did not feel that patient needed to be transferred to [facility] at this time. Patient will be admitted to the neuro floor. I have consulted the staff neurologist.  We will continue Decadron and IV Keppra. We will use as needed IV Ativan. MRI of the brain has been ordered. Neurochecks will be done. I have consulted [facility]. Patient will be on fall, seizure, aspiration precautions. Rest depending on patient's further hospital course. I discussed the plan of care with the patient and he verbalized understanding and agreed. I also discussed the level of care and patient wishes to be a full code. I also called patient's wife at phone #[phone] and she stated that her daughter was also there and she put me on speaker phone and I discussed patient's care with both patient's wife and daughter over the phone.         Prophylaxis:  []Lovenox  []Coumadin  []Hep SQ [x]SCDs  []H2B/PPI    Discussed Code Status:    [x]Full Code      []DNR     ___________________________________________________    Care Plan discussed with:    [x]Patient   [x]Family    []ED Care Manager  [x]ED Doc   []Specialist :    Total Time Coordinating Admission: 70  minutes    []Total Critical Care Time:     ___________________________________________________  Admitting Physician: Petros Jimenez MD

## 2020-10-26 NOTE — ED PROVIDER NOTES
EMERGENCY DEPARTMENT HISTORY AND PHYSICAL EXAM      Date: 10/26/2020  Patient Name: Navya Gomes    History of Presenting Illness     Chief Complaint   Patient presents with    Seizure       History Provided By: Patient and EMS    Chief Complaint: Instability, right arm weakness, seizure    Additional History (Context): Navya Gomes is a 77 y.o. male who presents with gait instability and right arm weakness and recurrent seizures. Patient has a history of glioblastoma multiform a status post resection, had a seizure at his initial time of diagnosis last year, however had been seizure-free on Keppra since that time until last week. Last week he did have a seizure, and was seen in this emergency department with a CT scan that was concerning for either residual tumor or recurrent tumor at the site of his operation, as well as some areas of likely vasogenic edema that were increased from comparison MRI performed in September. Patient's Keppra dose was increased, and he was discharged uneventfully. Patient states that he has noticed gait instability since that time. States that if he wants to move from 1 area to another, he needs to plan his route and then make sure there are no obstacles and go from one place to the next. If there is any change in his plan or object in the way, then he is prone to stumble and fall down. This is new for him, a definite change from his baseline. Also notes that he has right arm weakness which he feels has been chronic and little changed from his baseline, although it may be slightly worse than usual.  Today he was at home with his current baseline symptoms as just described, when he had a episode of seizure that was shaking of his upper extremities and focal weakness. He remained awake and alert during this episode, stated that he tried to [place] it off. \"  This is how his seizures have been both at his initial time of diagnosis as well as the seizure that he had last week. Stated the event lasted a few minutes and he felt slightly dysphoric for a few minutes afterwards, but by the time of arrival to the hospital is now back to his recent baseline. Family was present and witnessed this, they called EMS, who noted that the event resolved spontaneously prior to their arrival.  He had a normal blood sugar check had an uneventful transport to the emergency department. Patient denies any recent illness, fevers, chills, sweats, nausea, vomiting, vision changes, headache. Has been unsteady on his feet, however has had no falls that were not broken by him grabbing onto something and helping himself down to the ground. Does not have any bruises or areas of tenderness or injury. PCP: None    Current Outpatient Medications   Medication Sig Dispense Refill    levETIRAcetam (Keppra) 1,000 mg tablet Take 1 Tab by mouth two (2) times a day for 30 days. 60 Tab 0    dexAMETHasone (Decadron) 4 mg tablet Take 4 mg by mouth two (2) times daily (with meals) for 30 days. 60 Tab 0    tamsulosin (FLOMAX) 0.4 mg capsule Take 0.4 mg by mouth nightly.  tamsulosin (FLOMAX) 0.4 mg capsule Take 1 Cap by mouth daily (after dinner).  80 Cap 3       Past History     Past Medical History:  Past Medical History:   Diagnosis Date    Abuse     h/o alcohol abstinent since 1/13    Asbestosis(501) 1997    dx'ed in [place], f/u xrays negative    Bilateral hearing loss     Cervical spinal stenosis 08/2016    Dt Ruth; severe C5-6 on mri    Chronic pain     DIRE score 18 from 5/16; no response gabapentin/lyrica/topamax/cymbalta; controlled substance agreement in place    Chronic prostatitis     Dr. Verito Alvarez COPD 7/11    on CXR    DJD (degenerative joint disease)     c spine on mri 2004, t spine mri 10/13; saw Dr Nola Childs; intol lyrica, topamax and cymbalta; no response to gabapentin    Epididymitis     recurrent Dr Lee Ann Gorman 10/14; also gb polyps, no stones    FHx: heart disease     GERD (gastroesophageal reflux disease)     h/o gastritis; s/p dilation  Dr Deyanira Goncalves    Hyperlipidemia     calculated 10 year risk score 12.6% ()    Hypovitaminosis D     Microscopic hematuria     Pancreatitis     10/14 from binge alcohol drinking; on CT 10/14 [facility]    PUD (peptic ulcer disease)     antral ulcers, gastritis, gr 3 esophagitis, esoph ring s/p dilation, neg h pylori Dr Deyanira Goncalves    Recurrent kidney stones      Dr. Blane Orellana;        Past Surgical History:  Past Surgical History:   Procedure Laterality Date   [address]    thallium negative    HX CATARACT REMOVAL Bilateral 2012    HX COLONOSCOPY      Dr. No Rivero 2005 negative    HX HEMORRHOIDECTOMY      Dr. Zoe Moreno HX ORTHOPAEDIC  2006    left knee surgery/meniscus/patella in [place], [place] workmans comp    HX UROLOGICAL  4/15    [facility], Dr. Simona Benton, Cystoscopy, left retrograde ureteroscopy, holmium laser lithotripsy, double j catheter        Family History:  Family History   Problem Relation Age of Onset    Hypertension Mother     Diabetes Father     Heart Disease Father        Social History:  Social History     Tobacco Use    Smoking status: Former Smoker     Packs/day: 1.00     Last attempt to quit: 2011     Years since quittin.8    Smokeless tobacco: Former User   Substance Use Topics    Alcohol use: Yes     Comment: socially    Drug use: No       Allergies:   Allergies   Allergen Reactions    Benadryl [Diphenhydramine Hcl] Other (comments)     Patient states he becomes hyper      Cymbalta [Duloxetine] Other (comments)    Flexeril [Cyclobenzaprine] Other (comments)     Felt bad      Gabapentin Swelling    Lipitor [Atorvastatin] Myalgia    Lyrica [Pregabalin] Other (comments)     Weight gain    Nexium [Esomeprazole Magnesium] Other (comments)     Dry eyes    Pamelor [Nortriptyline] Hives    Pravastatin Myalgia    Septra [Sulfamethoprim Ds] Other (comments) Agitation      Sinequan [Doxepin] Other (comments)     Irritable      Topamax [Topiramate] Other (comments)     Possible kidney stones? Review of Systems   Review of Systems   Constitutional: Negative for chills, fatigue and fever. HENT: Negative for congestion, rhinorrhea, sore throat and trouble swallowing. Eyes: Negative for discharge, redness and itching. Respiratory: Negative for cough, chest tightness, shortness of breath, wheezing and stridor. Cardiovascular: Negative for chest pain, palpitations and leg swelling. Gastrointestinal: Negative for abdominal pain, blood in stool, diarrhea, nausea and vomiting. Endocrine: Negative for polydipsia, polyphagia and polyuria. Genitourinary: Negative for difficulty urinating, dysuria, flank pain, hematuria and urgency. Musculoskeletal: Negative for back pain, myalgias, neck pain and neck stiffness. Skin: Negative for rash. Neurological: Positive for seizures and weakness. Negative for dizziness, tremors, syncope, facial asymmetry, speech difficulty, light-headedness and headaches. Hematological: Does not bruise/bleed easily. Psychiatric/Behavioral: Negative for behavioral problems, confusion, self-injury, sleep disturbance and suicidal ideas. All other systems reviewed and are negative. Physical Exam     Vitals:    10/26/20 1000   BP: 129/75   Pulse: 62   Resp: 16   Temp: 97.6 °F (36.4 °C)   SpO2: 99%     Physical Exam  Constitutional:       General: He is not in acute distress. Appearance: He is well-developed and normal weight. HENT:      Head: Normocephalic and atraumatic. Nose: Nose normal.      Mouth/Throat:      Mouth: Mucous membranes are moist.      Pharynx: Oropharynx is clear. Eyes:      Extraocular Movements: Extraocular movements intact. Conjunctiva/sclera: Conjunctivae normal.      Pupils: Pupils are equal, round, and reactive to light.    Neck:      Musculoskeletal: Normal range of motion and neck supple. Cardiovascular:      Rate and Rhythm: Normal rate and regular rhythm. Heart sounds: Normal heart sounds. No murmur. Pulmonary:      Effort: Pulmonary effort is normal.      Breath sounds: Normal breath sounds. No wheezing. Abdominal:      General: Bowel sounds are normal.      Palpations: Abdomen is soft. Tenderness: There is no abdominal tenderness. Musculoskeletal: Normal range of motion. General: No tenderness. Skin:     General: Skin is warm and dry. Capillary Refill: Capillary refill takes less than 2 seconds. Neurological:      Mental Status: He is alert and oriented to person, place, and time. Comments: Right upper extremity 4+ out of 5 strength proximally and distally. Bilateral lower extremities and left upper extremity with 5 out of 5 strength proximally and distally. Sensation intact to light touch throughout. Has positive pronator drift of the right hand.    Psychiatric:         Mood and Affect: Mood normal.         Behavior: Behavior normal.           Diagnostic Study Results     Labs -     Recent Results (from the past 12 hour(s))   EKG, 12 LEAD, INITIAL    Collection Time: 10/26/20 10:06 AM   Result Value Ref Range    Ventricular Rate 56 BPM    Atrial Rate 56 BPM    P-R Interval 160 ms    QRS Duration 88 ms    Q-T Interval 412 ms    QTC Calculation (Bezet) 397 ms    Calculated P Axis 65 degrees    Calculated R Axis 62 degrees    Calculated T Axis 62 degrees    Diagnosis       Sinus bradycardia  Otherwise normal ECG  When compared with ECG of 24-OCT-2020 15:18,  premature atrial complexes are no longer present  Confirmed by Gunjan Franco MD, [place] ([id number]) on 10/26/2020 11:27:43 AM     PROTHROMBIN TIME + INR    Collection Time: 10/26/20 10:15 AM   Result Value Ref Range    Prothrombin time 12.3 11.5 - 15.2 sec    INR 0.9 0.8 - 1.2     METABOLIC PANEL, COMPREHENSIVE    Collection Time: 10/26/20 10:15 AM   Result Value Ref Range    Sodium 137 136 - 145 mmol/L Potassium 4.5 3.5 - 5.5 mmol/L    Chloride 107 100 - 111 mmol/L    CO2 24 21 - 32 mmol/L    Anion gap 6 3.0 - 18 mmol/L    Glucose 108 (H) 74 - 99 mg/dL    BUN 15 7.0 - 18 MG/DL    Creatinine 0.73 0.6 - 1.3 MG/DL    BUN/Creatinine ratio 21 (H) 12 - 20      GFR est AA >60 >60 ml/min/1.73m2    GFR est non-AA >60 >60 ml/min/1.73m2    Calcium 9.4 8.5 - 10.1 MG/DL    Bilirubin, total 0.4 0.2 - 1.0 MG/DL    ALT (SGPT) 37 16 - 61 U/L    AST (SGOT) 47 (H) 10 - 38 U/L    Alk. phosphatase 92 45 - 117 U/L    Protein, total 7.5 6.4 - 8.2 g/dL    Albumin 3.9 3.4 - 5.0 g/dL    Globulin 3.6 2.0 - 4.0 g/dL    A-G Ratio 1.1 0.8 - 1.7     NT-PRO BNP    Collection Time: 10/26/20 10:15 AM   Result Value Ref Range    NT pro-BNP 13 0 - 900 PG/ML   TROPONIN I    Collection Time: 10/26/20 10:15 AM   Result Value Ref Range    Troponin-I, QT <0.02 0.0 - 0.045 NG/ML   LIPASE    Collection Time: 10/26/20 10:15 AM   Result Value Ref Range    Lipase 85 73 - 393 U/L   MAGNESIUM    Collection Time: 10/26/20 10:15 AM   Result Value Ref Range    Magnesium 2.2 1.6 - 2.6 mg/dL   CBC WITH AUTOMATED DIFF    Collection Time: 10/26/20 11:00 AM   Result Value Ref Range    WBC 4.4 (L) 4.6 - 13.2 K/uL    RBC 4.43 (L) 4.70 - 5.50 M/uL    HGB 13.1 13.0 - 16.0 g/dL    HCT 39.0 36.0 - 48.0 %    MCV 88.0 74.0 - 97.0 FL    MCH 29.6 24.0 - 34.0 PG    MCHC 33.6 31.0 - 37.0 g/dL    RDW 15.1 (H) 11.6 - 14.5 %    PLATELET 132 383 - 649 K/uL    MPV 10.4 9.2 - 11.8 FL    NEUTROPHILS 84 (H) 40 - 73 %    LYMPHOCYTES 12 (L) 21 - 52 %    MONOCYTES 4 3 - 10 %    EOSINOPHILS 0 0 - 5 %    BASOPHILS 0 0 - 2 %    ABS. NEUTROPHILS 3.7 1.8 - 8.0 K/UL    ABS. LYMPHOCYTES 0.5 (L) 0.9 - 3.6 K/UL    ABS. MONOCYTES 0.2 0.05 - 1.2 K/UL    ABS. EOSINOPHILS 0.0 0.0 - 0.4 K/UL    ABS. BASOPHILS 0.0 0.0 - 0.1 K/UL    DF AUTOMATED         Radiologic Studies -   CT HEAD WO CONT   Final Result   Impression:      No significant interval change from 2 days prior.       Vasogenic edema and/or infiltrative tumor throughout the left cerebral convexity   which appears similar in extent to head CT from 2 days prior, but has advanced   since prior MRI on September 8. Soft tissue density within the region of vasogenic edema may represent residual   or recurrent tumor. Similar trace rightward midline shift. No large territory acute infarct or acute bleed. MRI BRAIN W WO CONT    (Results Pending)     CT Results  (Last 48 hours)               10/26/20 1147  CT HEAD WO CONT Final result    Impression:  Impression:       No significant interval change from 2 days prior. Vasogenic edema and/or infiltrative tumor throughout the left cerebral convexity   which appears similar in extent to head CT from 2 days prior, but has advanced   since prior MRI on September 8. Soft tissue density within the region of vasogenic edema may represent residual   or recurrent tumor. Similar trace rightward midline shift. No large territory acute infarct or acute bleed. Narrative:  CT brain without enhancement        CPT code: 27580       Indication: Seizure. Gait instability. Technique: Unenhanced 5 mm collimation axial images obtained from the skull base   through the vertex. Dose reduction techniques used: Automated exposure control, adjustment of the   mAs and/or kVp according to patient size, standardized low-dose protocol, and/or   iterative reconstruction technique. Comparison: October 24, 2020. Findings: Vasogenic edema and/or infiltrative tumor in the left cerebral   convexity is again seen. Compared to head CT from 2 days prior, this appears   relatively similar in extent. Heterogeneous soft tissue posteriorly in this   region may also represent recurrent tumor and appears stable. There is no new   hemorrhage and no new infarct. Trace rightward midline shift is again seen. This   is stable.  Chronic dural thickening along the right cerebral convexity is again   seen. Chronic extra dural blood is again seen. Sinuses: Clear. Orbits: Normal.   Calvarium: Prior left-sided craniotomy. CXR Results  (Last 48 hours)    None            Medical Decision Making   I am the first provider for this patient. I reviewed the vital signs, available nursing notes, past medical history, past surgical history, family history and social history. Vital Signs-Reviewed the patient's vital signs. EKG: Interpreted by the EP. Time Interpreted: 10:06 AM   Rate: 56 bpm   Rhythm: Normal sinus rhythm/sinus bradycardia   Interpretation: No acute changes    Records Reviewed: Nursing Notes and Old Medical Records    Critical Care Time:  The services I provided to this patient were to treat and/or prevent clinically significant deterioration that could result in the failure of one or more body systems and/or organ systems due to known brain tumor with new vasogenic edema and recurrent seizures and new gait difficulty/inability to ambulate. Services included the following:  -reviewing nursing notes and old charts  -vital sign assessments  -direct patient care  -medication orders and management  -interpreting and reviewing diagnostic studies/labs  -re-evaluations  -documentation time    Aggregate critical care time was 70 minutes, which includes only time during which I was engaged in work directly related to the patient's care as described above, whether I was at bedside or elsewhere in the Emergency Department. It did not include time spent performing other reported procedures or the services of residents, students, nurses, or advance practice providers. Lj Boyd MD  2:46 PM      ED Course:   Patient remained stable during his emergency department stay. He attempted to ambulate to the restroom, however had marked difficulty and nearly fell, required the assistance of nursing staff to remain upright.     Consult:  Discussed care with Dr. Peyman Castillo, Specialty: tele-neurology. Standard discussion; including history of patients chief complaint, available diagnostic results, and treatment course. Given that patient had a new seizure again today, he recommends getting a repeat head CT now to ensure that the vasogenic edema is not worsening. If there is increased edema, he would recommend Decadron 4 mg every 6 hours. If the CT scan is stable from last week, he recommends admission to the hospital with MRI and consultation with patient's oncologist for further treatment/management plans. He would not change the patient's Keppra dose for now, and would not recommend giving any extra Keppra now. Consult at 10 AM, 10/26/2020     Consult:  Discussed care with GEO Dawkins, Specialty: Neurosurgery at [facility]). Standard discussion; including history of patients chief complaint, available diagnostic results, and treatment course. He and the neurosurgery attending that he works with are very familiar with the patient, he is actually scheduled to be reviewed by their tumor board next week Friday. We discussed the results of the CT scan today and last week, and they feel that there is no need for acute neurosurgical intervention. Recommends Decadron 4 mg IV every 6 hours for his vasogenic edema, and recommended that I speak with the [facility] neurology for admission given the patient's new gait disturbance. 1:22 PM, 10/26/2020     Consult:  Discussed care with Dr. Tanner Boone, Specialty: neurology at [facility]. Standard discussion; including history of patients chief complaint, available diagnostic results, and treatment course. He feels it is appropriate that neurosurgery has started Decadron, he would also recommend increasing the Keppra from 1000 mg twice a day to 1250 mg twice a day.   He notes that since this is likely related to patient's tumor, and that the patient is not in status epilepticus, that this would not be a transfer that he would be the primary managing physician as a neurologist.  He defers to neurosurgery as far as the intensity and timing of obtaining further diagnostic imaging (MRI) to help in management of his breakthrough seizures and inability to ambulate. 1:43 PM, 10/26/2020     Consult:  Discussed care with Dr. Haseeb Foy, Specialty: Hospitalist.  Standard discussion; including history of patients chief complaint, available diagnostic results, and treatment course. She feels that the patient may need to be admitted, however does not feel that there  1:59 PM, 10/26/2020     Consult:  Discussed care with [facility] neurosurgery and hospitalist on a conference call. Standard discussion; including history of patients chief complaint, available diagnostic results, and treatment course. They agree that the patient would benefit from admission to the hospital for his recurrent seizures and new acute gait issues, however since he has no emergent neurosurgical need for intervention, he does not need to go to their quaternary care center. They feel he is stable for admission to our facility, obtaining MRI of his head and initiating treatment with steroids and increasing Keppra dose as per the recommendations above. 2:07 PM, 10/26/2020     Consult:  Discussed care with Dr. Sanjeev Andres, Specialty: hospitalist at  [facility]. Standard discussion; including history of patients chief complaint, available diagnostic results, and treatment course. Will admit the patient. 2:40 PM, 10/26/2020       Disposition:  Admit    Provider Notes (Medical Decision Making):   Known brain cancer (GBM) status post resection now with CT that is concerning for possible recurrence and vasogenic edema. Patient also has recurrence of his seizure that he has not had since the time of his surgery, and now has a new gait difficulty without focal neurologic deficit.   Patient needs to be started on Decadron for his vasogenic edema, and his Keppra dose increased as above, and have an MRI obtained to further delineate any possible progression of his tumor, as well as evaluate for possible other etiologies of his new symptoms such as a stroke or other tumor related complication. For Hospitalized Patients:    1. Hospitalization Decision Time:  The decision to hospitalize the patient was made by Dr. Huma Porter at ~2:40pm on 10/26/2020      Diagnosis     Clinical Impression:   1. Seizure disorder ([place])    2. Breakthrough seizure ([place])    3. Brain tumor ([place])    4. Right arm weakness    5. Gait instability    6.  Vasogenic brain edema ([place])

## 2020-10-26 NOTE — ED NOTES
Report called to [facility] on 4S, pt in MRI currently and will be transferred to room 414

## 2020-10-26 NOTE — PROGRESS NOTES
MRI Safety Screening form needs to be filled out and FAXED to [phone] BEFORE MRI can be scheduled. If unable to acquire information from patient, [facility] must be contacted, or screening xrays will need to be ordred.     If pt is Claustrophobic or needs Pain Meds, please have these ordered in advance to help facilitate MRI exam.

## 2020-10-26 NOTE — ED NOTES
Dr Emily York in to evaluate pt. Pt had a \"shaking\" episode during evaluation. Ativan given per order.  Awaiting MRI

## 2020-10-26 NOTE — ED TRIAGE NOTES
Pt had a witnessed seizure at home. Was seen couple days ago and keppra dose was increased. Has hx of Brain CA.  Pt A&Ox4

## 2020-10-27 LAB
ALBUMIN SERPL-MCNC: 3.9 G/DL (ref 3.4–5)
ALBUMIN/GLOB SERPL: 1.3 {RATIO} (ref 0.8–1.7)
ALP SERPL-CCNC: 94 U/L (ref 45–117)
ALT SERPL-CCNC: 35 U/L (ref 16–61)
ANION GAP SERPL CALC-SCNC: 6 MMOL/L (ref 3–18)
AST SERPL-CCNC: 27 U/L (ref 10–38)
BASOPHILS # BLD: 0 K/UL (ref 0–0.1)
BASOPHILS NFR BLD: 0 % (ref 0–2)
BILIRUB SERPL-MCNC: <0.1 MG/DL (ref 0.2–1)
BUN SERPL-MCNC: 16 MG/DL (ref 7–18)
BUN/CREAT SERPL: 24 (ref 12–20)
CALCIUM SERPL-MCNC: 9.6 MG/DL (ref 8.5–10.1)
CHLORIDE SERPL-SCNC: 105 MMOL/L (ref 100–111)
CHOLEST SERPL-MCNC: 287 MG/DL
CO2 SERPL-SCNC: 27 MMOL/L (ref 21–32)
CREAT SERPL-MCNC: 0.66 MG/DL (ref 0.6–1.3)
DIFFERENTIAL METHOD BLD: ABNORMAL
EOSINOPHIL # BLD: 0 K/UL (ref 0–0.4)
EOSINOPHIL NFR BLD: 0 % (ref 0–5)
ERYTHROCYTE [DISTWIDTH] IN BLOOD BY AUTOMATED COUNT: 15 % (ref 11.6–14.5)
EST. AVERAGE GLUCOSE BLD GHB EST-MCNC: 94 MG/DL
GLOBULIN SER CALC-MCNC: 3 G/DL (ref 2–4)
GLUCOSE BLD STRIP.AUTO-MCNC: 112 MG/DL (ref 70–110)
GLUCOSE BLD STRIP.AUTO-MCNC: 118 MG/DL (ref 70–110)
GLUCOSE BLD STRIP.AUTO-MCNC: 122 MG/DL (ref 70–110)
GLUCOSE SERPL-MCNC: 97 MG/DL (ref 74–99)
HBA1C MFR BLD: 4.9 % (ref 4.2–5.6)
HCT VFR BLD AUTO: 42.7 % (ref 36–48)
HDLC SERPL-MCNC: 63 MG/DL (ref 40–60)
HDLC SERPL: 4.6 {RATIO} (ref 0–5)
HGB BLD-MCNC: 14.5 G/DL (ref 13–16)
INR PPP: 0.9 (ref 0.8–1.2)
LDLC SERPL CALC-MCNC: 194.4 MG/DL (ref 0–100)
LIPID PROFILE,FLP: ABNORMAL
LYMPHOCYTES # BLD: 2.3 K/UL (ref 0.9–3.6)
LYMPHOCYTES NFR BLD: 28 % (ref 21–52)
MAGNESIUM SERPL-MCNC: 2.2 MG/DL (ref 1.6–2.6)
MCH RBC QN AUTO: 29.8 PG (ref 24–34)
MCHC RBC AUTO-ENTMCNC: 34 G/DL (ref 31–37)
MCV RBC AUTO: 87.7 FL (ref 74–97)
MONOCYTES # BLD: 0.9 K/UL (ref 0.05–1.2)
MONOCYTES NFR BLD: 11 % (ref 3–10)
NEUTS SEG # BLD: 4.9 K/UL (ref 1.8–8)
NEUTS SEG NFR BLD: 61 % (ref 40–73)
PLATELET # BLD AUTO: 212 K/UL (ref 135–420)
PMV BLD AUTO: 10.5 FL (ref 9.2–11.8)
POTASSIUM SERPL-SCNC: 3.9 MMOL/L (ref 3.5–5.5)
PROT SERPL-MCNC: 6.9 G/DL (ref 6.4–8.2)
PROTHROMBIN TIME: 12.4 SEC (ref 11.5–15.2)
RBC # BLD AUTO: 4.87 M/UL (ref 4.7–5.5)
SODIUM SERPL-SCNC: 138 MMOL/L (ref 136–145)
TRIGL SERPL-MCNC: 148 MG/DL (ref ?–150)
VLDLC SERPL CALC-MCNC: 29.6 MG/DL
WBC # BLD AUTO: 8 K/UL (ref 4.6–13.2)

## 2020-10-27 PROCEDURE — 83735 ASSAY OF MAGNESIUM: CPT

## 2020-10-27 PROCEDURE — 97116 GAIT TRAINING THERAPY: CPT

## 2020-10-27 PROCEDURE — 85025 COMPLETE CBC W/AUTO DIFF WBC: CPT

## 2020-10-27 PROCEDURE — 80053 COMPREHEN METABOLIC PANEL: CPT

## 2020-10-27 PROCEDURE — 99223 1ST HOSP IP/OBS HIGH 75: CPT | Performed by: NURSE PRACTITIONER

## 2020-10-27 PROCEDURE — 65660000000 HC RM CCU STEPDOWN

## 2020-10-27 PROCEDURE — 36415 COLL VENOUS BLD VENIPUNCTURE: CPT

## 2020-10-27 PROCEDURE — 83036 HEMOGLOBIN GLYCOSYLATED A1C: CPT

## 2020-10-27 PROCEDURE — 99232 SBSQ HOSP IP/OBS MODERATE 35: CPT | Performed by: FAMILY MEDICINE

## 2020-10-27 PROCEDURE — 2709999900 HC NON-CHARGEABLE SUPPLY

## 2020-10-27 PROCEDURE — 80061 LIPID PANEL: CPT

## 2020-10-27 PROCEDURE — 97535 SELF CARE MNGMENT TRAINING: CPT

## 2020-10-27 PROCEDURE — 74011000258 HC RX REV CODE- 258: Performed by: EMERGENCY MEDICINE

## 2020-10-27 PROCEDURE — 99233 SBSQ HOSP IP/OBS HIGH 50: CPT | Performed by: STUDENT IN AN ORGANIZED HEALTH CARE EDUCATION/TRAINING PROGRAM

## 2020-10-27 PROCEDURE — 97166 OT EVAL MOD COMPLEX 45 MIN: CPT

## 2020-10-27 PROCEDURE — 74011250636 HC RX REV CODE- 250/636: Performed by: EMERGENCY MEDICINE

## 2020-10-27 PROCEDURE — 85610 PROTHROMBIN TIME: CPT

## 2020-10-27 PROCEDURE — 74011250637 HC RX REV CODE- 250/637: Performed by: EMERGENCY MEDICINE

## 2020-10-27 PROCEDURE — 82962 GLUCOSE BLOOD TEST: CPT

## 2020-10-27 PROCEDURE — 97162 PT EVAL MOD COMPLEX 30 MIN: CPT

## 2020-10-27 RX ADMIN — Medication 10 ML: at 14:00

## 2020-10-27 RX ADMIN — Medication 10 ML: at 21:19

## 2020-10-27 RX ADMIN — DEXAMETHASONE SODIUM PHOSPHATE 4 MG: 4 INJECTION, SOLUTION INTRA-ARTICULAR; INTRALESIONAL; INTRAMUSCULAR; INTRAVENOUS; SOFT TISSUE at 17:17

## 2020-10-27 RX ADMIN — DEXAMETHASONE SODIUM PHOSPHATE 4 MG: 4 INJECTION, SOLUTION INTRA-ARTICULAR; INTRALESIONAL; INTRAMUSCULAR; INTRAVENOUS; SOFT TISSUE at 12:48

## 2020-10-27 RX ADMIN — SODIUM CHLORIDE 1250 MG: 900 INJECTION, SOLUTION INTRAVENOUS at 09:27

## 2020-10-27 RX ADMIN — ACETAMINOPHEN 650 MG: 325 TABLET ORAL at 23:09

## 2020-10-27 RX ADMIN — TAMSULOSIN HYDROCHLORIDE 0.4 MG: 0.4 CAPSULE ORAL at 21:19

## 2020-10-27 RX ADMIN — SODIUM CHLORIDE 1250 MG: 900 INJECTION, SOLUTION INTRAVENOUS at 16:50

## 2020-10-27 RX ADMIN — DEXAMETHASONE SODIUM PHOSPHATE 4 MG: 4 INJECTION, SOLUTION INTRA-ARTICULAR; INTRALESIONAL; INTRAMUSCULAR; INTRAVENOUS; SOFT TISSUE at 23:09

## 2020-10-27 NOTE — PROGRESS NOTES
Progress Note         Patient: Marlene Torre MRN: [id number]  CSN: [id number]    YOB: 1945  Age: 77 y.o. Sex: male    DOA: 10/26/2020 LOS:  LOS: 1 day                    Subjective:     Marlene Torre is a 77 y.o. male with a PMHx of glioblastoma multiforme status post resection, chemo and radiation, PUD with grade 3 esophagitis, COPD, HLD, GERD, and DJD who is admitted for recurrent seizures. Comfortable in bed today, NAD  Alert to person and place, not situation or time  Confused, cannot answer many questions accurately  Has rambling and tangential speech pattern  Denies complaints, reports he knows why he is here, then does not answer questions and starts discussing his 9 children      Objective:     Physical Exam:  Visit Vitals  BP (!) 148/85 (BP 1 Location: Right arm, BP Patient Position: Supine)   Pulse 71   Temp 97.9 °F (36.6 °C)   Resp 19   Ht 5' 9\" (1.753 m)   Wt 79.4 kg (175 lb 0.7 oz)   SpO2 94%   BMI 25.85 kg/m²        General:         Alert, cooperative, no acute distress, confused  HEENT: NC, Atraumatic. Anicteric sclerae. Lungs: CTA Bilaterally. No Wheezing/Rhonchi/Rales. Heart:  Regular  rhythm,  No murmur, No Rubs, No Gallops  Abdomen: Soft, Non distended, Non tender. +Bowel sounds, no HSM  Extremities: No c/c/e  Psych:   Not anxious or agitated.   Neurologic:  Alert and oriented X1     Intake and Output:  Current Shift:  10/27 0701 - 10/27 1900  In: -   Out: 400 [Urine:400]  Last three shifts:  10/25 1901 - 10/27 0700  In: 240 [P.O.:240]  Out: 0     Labs: Results:       Chemistry Recent Labs     10/27/20  0506 10/26/20  1015   GLU 97 108*    137   K 3.9 4.5    107   CO2 27 24   BUN 16 15   CREA 0.66 0.73   CA 9.6 9.4   AGAP 6 6   BUCR 24* 21*   AP 94 92   TP 6.9 7.5   ALB 3.9 3.9   GLOB 3.0 3.6   AGRAT 1.3 1.1      CBC w/Diff Recent Labs     10/27/20  0506 10/26/20  1100   WBC 8.0 4.4*   RBC 4.87 4.43*   HGB 14.5 13.1   HCT 42.7 39.0    209   GRANS 61 84* LYMPH 28 12*   EOS 0 0      Cardiac Enzymes No results for input(s): CPK, CKND1, FERNANDO in the last 72 hours. No lab exists for component: CKRMB, TROIP   Coagulation Recent Labs     10/27/20  0506 10/26/20  1015   PTP 12.4 12.3   INR 0.9 0.9       Lipid Panel Lab Results   Component Value Date/Time    Cholesterol, total 287 (H) 10/27/2020 05:06 AM    HDL Cholesterol 63 (H) 10/27/2020 05:06 AM    LDL, calculated 194.4 (H) 10/27/2020 05:06 AM    VLDL, calculated 29.6 10/27/2020 05:06 AM    Triglyceride 148 10/27/2020 05:06 AM    CHOL/HDL Ratio 4.6 10/27/2020 05:06 AM      BNP No results for input(s): BNPP in the last 72 hours. Liver Enzymes Recent Labs     10/27/20  0506   TP 6.9   ALB 3.9   AP 94      Thyroid Studies No results found for: T4, T3U, TSH, TSHEXT             Assessment and Plan:     Faina Perez is a 77 y.o. male with a PMHx of glioblastoma multiforme status post resection, chemo and radiation, PUD with grade 3 esophagitis, COPD, HLD, GERD, and DJD who is admitted for recurrent seizures. 1. Recurrent seizures  2. Glioblastoma multiforme status post gross resection, chemo and radiation, likely recurrent  3. Left-sided parietal mass  4. COPD without acute exacerbation  5. Hx PAD with grade 3 esophagitis  6. HLD  7. GERD  8. DJD    Heme/Onc and neurology consulted  MRI brain 10/26shows notable progression of GBM with increased vasogenic edema  Continue IV Keppra twice daily  Continue IV dexamethasone every 6 hours per heme/onc  Continue SSI with Accu-Cheks  Continue other home meds  Aspiration and fall precautions  PT, OTrecommending  with 24/7 supervision  Palliative care consultpatient appears to be confused and not capable of making decisions for himself, family is considering changing [address] and discussed with wife and children over the phone.         Case discussed with:  [x]Patient  []Family  [x]Nursing  [x]Case Management  DVT prophylaxis: SCDs  Diet: Cardiac  Contact: Roxy Gomez (wife)     [phone]  Code Status: Full- Pt capable of making decisions for himself right now, family and wife are considering changing CODE STATUS to DNR/DNI  Disposition: Continue current care, likely DC home and 1 to 2 days      H.  Chris Rodríguez DO  10/27/2020

## 2020-10-27 NOTE — PROGRESS NOTES
Problem: Mobility Impaired (Adult and Pediatric)  Goal: *Acute Goals and Plan of Care (Insert Text)  Description: Physical Therapy Goals  Initiated 10/27/2020 and to be accomplished within 7 day(s)  1. Patient will move from supine to sit and sit to supine , scoot up and down, and roll side to side in bed with modified independence. 2.  Patient will transfer from bed to chair and chair to bed with supervision/set-up using the least restrictive device. 3.  Patient will perform sit to stand with supervision/set-up. 4.  Patient will ambulate with supervision/set-up for 75 feet with the least restrictive device. 5.  Patient will ascend/descend 3 stairs with 1 handrail(s) with supervision/set-up. PLOF: Pt reports being ind PTA and active, lives with his wife in a one story home with 3-4 ZENIA, has DME like walker, shower chair, BSC but does not currently use      Outcome: Progressing Towards Goal     PHYSICAL THERAPY EVALUATION    Patient: Alia Hansen [de-identified]77 y.o. male)  Date: 10/27/2020  Primary Diagnosis: Brain cancer ([facility] [address] [C71.9]  Partial seizures ([address] [R56.9]  Gait disturbance [R26.9]  Seizures ([address] [R56.9]        Precautions:  Fall, Aspiration  WBAT  PLOF: see above     ASSESSMENT :  Based on the objective data described below, the patient presents with decreased functional mobility, impaired balance, impaired gait, generalized weakness, decreased safety awareness and insight into deficits as well as impulsivity. Pt was seen with OT to maximize functional mobility and safety, pt received in bed agreeable to PT and cleared by nursing to participate. Pt reports being independent PTA and not needing us there but was agreeable and wished to use the [facility]. Pt is able to perform bed mobility with mod ind/sup for safety.  He stands from bed with CGA and utilizes commode rails to stand pivot and sit on commode with unsteadiness noted however despite cues pt stands up following voiding without assist to pull his pants up where PT then comes next to pt and he ambulates around the room, with HHA, however pt impulsively walking around obstacles, paying no mind to IV and even pushing IV pole out of the way with his foot to get by it at one time. Pt with ataxic like gait pattern, fluctuating gait speeds and no safety awareness noted, pt trying to exhibit how functional he is when really he was being unsafe. Pt finishes gait trial sitting up in recliner with chair alarm activated and left with all needs met. Pt was educated heavily on calling for assist before getting up and he was agreeable, nurse updated on progress this session. Pt would benefit from skilled PT in the acute setting to maximize functional mobility and safety prior to discharge. Recommend HH for safety and 24/7 supervision upon discharge 2/2 pt impulsivity and lack of safety awareness, putting him at increased risk of falls. Will continue to follow in the acute setting. Patient will benefit from skilled intervention to address the above impairments.   Patient's rehabilitation potential is considered to be Good  Factors which may influence rehabilitation potential include:   []         None noted  [x]         Mental ability/status  [x]         Medical condition  []         Home/family situation and support systems  [x]         Safety awareness  []         Pain tolerance/management  []         Other:      PLAN :  Recommendations and Planned Interventions:   [x]           Bed Mobility Training             [x]    Neuromuscular Re-Education  [x]           Transfer Training                   []    Orthotic/Prosthetic Training  [x]           Gait Training                          []    Modalities  [x]           Therapeutic Exercises           []    Edema Management/Control  [x]           Therapeutic Activities            [x]    Family Training/Education  [x]           Patient Education  []           Other (comment):    Frequency/Duration: Patient will be followed by physical therapy 1-2 times per day/4-7 days per week to address goals. Discharge Recommendations: Home Health with 24/7 supervision 2/2 safety concerns with impulsivity  Further Equipment Recommendations for Discharge: N/A     SUBJECTIVE:   Patient stated I can do it, I walk 20,000 steps a day.     OBJECTIVE DATA SUMMARY:     Past Medical History:   Diagnosis Date    Abuse     h/o alcohol abstinent since 1/13    Asbestosis(501) 1997    dx'ed in [place], f/u xrays negative    Bilateral hearing loss     Cervical spinal stenosis 08/2016    Dt Ruth; severe C5-6 on mri    Chronic pain     DIRE score 18 from 5/16; no response gabapentin/lyrica/topamax/cymbalta; controlled substance agreement in place    Chronic prostatitis     Dr. Deon Rios    COPD 7/11    on CXR    DJD (degenerative joint disease)     c spine on mri 2004, t spine mri 10/13; saw Dr Nola Childs; intol lyrica, topamax and cymbalta; no response to gabapentin    Epididymitis     recurrent Dr Kimberlee Fleming 10/14; also gb polyps, no stones    FHx: heart disease     GERD (gastroesophageal reflux disease)     h/o gastritis; s/p dilation 8/14 Dr Cb Coyne    Hyperlipidemia     calculated 10 year risk score 12.6% (2014)    Hypovitaminosis D 7/14    Microscopic hematuria     Pancreatitis     10/14 from binge alcohol drinking; on CT 10/14 [place]    PUD (peptic ulcer disease) 8/14    antral ulcers, gastritis, gr 3 esophagitis, esoph ring s/p dilation, neg h pylori Dr Cb Coyne    Recurrent kidney stones     2006 Dr. Deon Rios; 2015     Past Surgical History:   Procedure Laterality Date    [address]    thallium negative    HX CATARACT REMOVAL Bilateral 2012    HX COLONOSCOPY      Dr. Laly Lira 2005 negative    HX HEMORRHOIDECTOMY  2005    Dr. Berg Grade ORTHOPAEDIC  2006    left knee surgery/meniscus/patella in [place], [place] workmans comp    HX UROLOGICAL  4/15    [facility], Dr. Janet Siemens, Cystoscopy, left retrograde ureteroscopy, holmium laser lithotripsy, double j catheter      Barriers to Learning/Limitations: yes;  cognitive  Compensate with: Visual Cues, Verbal Cues, and Tactile Cues  Home Situation:  Home Situation  Home Environment: Private residence  One/Two Story Residence: One story  Support Systems: Spouse/Significant Other/Partner  Patient Expects to be Discharged to[de-identified] Private residence  Current DME Used/Available at Home: Walker, rolling  Tub or Shower Type: Tub/Shower combination  Critical Behavior:  Neurologic State: Anesthetized  Orientation Level: Oriented X4  Cognition: Decreased attention/concentration; Impulsive;Poor safety awareness;Decreased command following  Safety/Judgement: Fall prevention;Home safety; Lack of insight into deficits  Psychosocial  Patient Behaviors: Calm; Cooperative;Demanding                 Strength:    Strength: Grossly decreased, non-functional                    Tone & Sensation:   Tone: Normal       Sensation: Intact        Range Of Motion:  AROM: Within functional limits      Functional Mobility:  Bed Mobility:  Rolling: Modified independent  Supine to Sit: Modified independent     Scooting: Supervision  Transfers:  Sit to Stand: Contact guard assistance  Stand to Sit: Contact guard assistance        Bed to Chair: Contact guard assistance       Balance:   Sitting: Impaired  Sitting - Static: Good (unsupported)  Sitting - Dynamic: Fair (occasional)  Standing: Impaired  Standing - Static: Fair  Standing - Dynamic : Fair    Ambulation/Gait Training:  Distance (ft): 25 Feet (ft)  Assistive Device: Walker, rolling  Ambulation - Level of Assistance: Contact guard assistance        Gait Abnormalities: Early heel rise; Ataxic        Base of Support: Narrowed; Center of gravity altered     Speed/Eda: Fluctuations  Step Length: Right shortened;Left shortened    Pain:  Pain level pre-treatment: 0/10   Pain level post-treatment: 0/10       Activity Tolerance:   Good    Please refer to the flowsheet for vital signs taken during this treatment. After treatment:   [x]         Patient left in no apparent distress sitting up in chair  []         Patient left in no apparent distress in bed  [x]         Call bell left within reach  [x]         Nursing notified  []         Caregiver present  [x]         Chair alarm activated  []         SCDs applied    COMMUNICATION/EDUCATION:   [x]         Role of Physical Therapy in the acute care setting. [x]         Fall prevention education was provided and the patient/caregiver indicated understanding. [x]         Patient/family have participated as able in goal setting and plan of care. [x]         Patient/family agree to work toward stated goals and plan of care. []         Patient understands intent and goals of therapy, but is neutral about his/her participation. []         Patient is unable to participate in goal setting/plan of care: ongoing with therapy staff.  []         Other:     Thank you for this referral.  [place] [facility]   Time Calculation: 25 mins      Eval Complexity: History: MEDIUM  Complexity : 1-2 comorbidities / personal factors will impact the outcome/ POC Exam:MEDIUM Complexity : 3 Standardized tests and measures addressing body structure, function, activity limitation and / or participation in recreation  Presentation: MEDIUM Complexity : Evolving with changing characteristics  Clinical Decision Making:Medium Complexity    Overall Complexity:MEDIUM

## 2020-10-27 NOTE — PROGRESS NOTES
HEMATOLOGY AND ONCOLOGY   PROGRESS NOTE      Patient: Nurys Dick   MRN: [id number]      YOB: 1945      Admit Date: 10/26/2020    Assessment:     # GBM: date of diagnosis February 2020. MGMT negative. IDH1/IDH2 not detected. 1p/19q not deleted. - Left-sided parietal mass. A 4 x 4.5 x 5 cm associated with seizures and altered mental status on presentation.  - MGMT methylation and molecular markers, pending.  - Status post craniotomy with gross resection ( [id number]). - Adjuvant chemoradiotherapy beginning April 2020, using standard dose Temozolomide 75 mg/m2 daily throughout six weeks of radiation finishing up 06/10/20.  - Postradiation baseline brain MRI, 07/10/20, showed 15 x 10 mm new nodular focus of enhancement at the anterior resection margin with corresponding elevated CBV and diffusion restriction consistent with recurrent tumor.  - Single-agent Temozolomide 150 mg/m2 daily on a 5/28 day cycle, started on 07/20/20 along with Optune TTF.  - Followup brain MRI, 09/08/20, suggestive of modest progression of residual tumor in left parietal convexity craniotomy resection margin, now measuring 1.9 x 1.3 cm, previously 1.4 x 1.1. # SEIZURE DISORDER:    Plan:     - I discussed the findings of brain MRI with the patient and explained to him the changes reported by radiology. Will keep him on decadron 4 mg q 6hr, it can be tapered once seizure is well controlled. - We need to have neurology input and determine the outpatient antiepileptic plan. - Seizure and falls precautions. - Pepcid while on steroids.   - DVT prophylaxis as per the primary team.     I discussed the plan with the patient and answered his questions. Julius Gill MD  [facility] [phone]    Subjective:     Patient is known to the [facility]. 15 service. He has been followed by Dr. Annalee Hubbard for GBM. History is listed above.  He is admitted now after he experienced seizure activity at home over last weekend. He states that seizure was involving his R side as before with no loss of consciousness, confusion or any incontinence. It was witnessed by his family. He was brought to ER on 10/26/2020, he was evaluated by neurology via telemedicine. Further evaluation by head CT/MRi and EEg was recommended. Patient was started on Decadron and kept on Keppra till he gets further evaluation by inpatient neurology. Brain MRI demonstrates a notable progression of heterogeneously enhancing tumor at the deep anterior inferior aspect of surgical site, now about 1.9 cm diameter; more substantially, just inferior this and contiguous to it is an oblong contiguous portion of tumor measuring 3.7 x 3.3 x 2.6 cm; shows similar enhancement abutting posterior portion of left lateral ventricle. Previously that area measured on the order of 2 x 1.5 cm. There is accompanying increased extent of vasogenic edema pattern around this area. He feels better this morning but continues to have R side weakness leg more than his arm. No headache, slurred speech or visual changes. He states that he took x2 tablets of Temodar only last week, he could not complete the 5 days course d/t nausea. Objective:     Patient Vitals for the past 24 hrs:   BP Temp Pulse Resp SpO2 Height Weight   10/27/20 1150 (!) 142/85 97.4 °F (36.3 °C) (!) 102 19 95 %     10/27/20 0748 (!) 134/91 98.1 °F (36.7 °C) 67 18 95 %     10/26/20 2000 (!) 143/89 97.5 °F (36.4 °C) 66 18 94 % 5' 9\" (1.753 m) 79.4 kg (175 lb 0.7 oz)         Intake/Output Summary (Last 24 hours) at 10/27/2020 1251  Last data filed at 10/26/2020 2000  Gross per 24 hour   Intake 240 ml   Output 0 ml   Net 240 ml       Temp (24hrs), Av.7 °F (36.5 °C), Min:97.4 °F (36.3 °C), Max:98.1 °F (36.7 °C)       Review Of Systems:     Constitutional: negative for fevers, or sweats. + fatigue.    Eyes: negative for visual disturbance, redness and icterus  Ears, Nose, Mouth, Throat, and Face: negative for tinnitus, epistaxis, sore mouth and hoarseness  Respiratory: negative for cough, sputum, hemoptysis, pleurisy/chest pain or wheezing. Cardiovascular: negative for chest pain, chest pressure/discomfort, palpitations, irregular heart beats, syncope, paroxysmal nocturnal dyspnea  Gastrointestinal: negative for reflux symptoms, change in bowel habits, melena, diarrhea, constipation and abdominal pain. He has nausea. Genitourinary:negative for dysuria, nocturia, urinary incontinence, hesitancy and hematuria  Integument: negative for rash, skin lesion(s) and pruritus  Hematologic/Lymphatic: negative for easy bruising, bleeding and lymphadenopathy  Musculoskeletal:negative for myalgias, arthralgias and bone pain  Neurological: + seizure R side. Right leg weakness. Physical Exam:     Constitutional: Alert, oriented, not in distress  Eyes: PERRLA, anicteric, no redness  HEENT: no palpable Lymph nodes, no mucositis, no thrush. Respiratory: symmetrical expansion, no rales, no rhonchi, no wheezing. Cardiovascular: S1S2 positive. Gastrointestinal: soft, benign, non tender, no palpable mass. Integument: no rash, no petechiae, no ecchymosis. Musculoskeletal: no edema, no cyanosis. Neurological: intact, symmetrical exam of UE. He has minimal weakness of R leg compared to left leg. Lab:     Recent Results (from the past 24 hour(s))   CBC WITH AUTOMATED DIFF    Collection Time: 10/27/20  5:06 AM   Result Value Ref Range    WBC 8.0 4.6 - 13.2 K/uL    RBC 4.87 4.70 - 5.50 M/uL    HGB 14.5 13.0 - 16.0 g/dL    HCT 42.7 36.0 - 48.0 %    MCV 87.7 74.0 - 97.0 FL    MCH 29.8 24.0 - 34.0 PG    MCHC 34.0 31.0 - 37.0 g/dL    RDW 15.0 (H) 11.6 - 14.5 %    PLATELET 913 282 - 095 K/uL    MPV 10.5 9.2 - 11.8 FL    NEUTROPHILS 61 40 - 73 %    LYMPHOCYTES 28 21 - 52 %    MONOCYTES 11 (H) 3 - 10 %    EOSINOPHILS 0 0 - 5 %    BASOPHILS 0 0 - 2 %    ABS. NEUTROPHILS 4.9 1.8 - 8.0 K/UL    ABS. LYMPHOCYTES 2.3 0.9 - 3.6 K/UL    ABS. MONOCYTES 0.9 0.05 - 1.2 K/UL    ABS. EOSINOPHILS 0.0 0.0 - 0.4 K/UL    ABS. BASOPHILS 0.0 0.0 - 0.1 K/UL    DF AUTOMATED     METABOLIC PANEL, COMPREHENSIVE    Collection Time: 10/27/20  5:06 AM   Result Value Ref Range    Sodium 138 136 - 145 mmol/L    Potassium 3.9 3.5 - 5.5 mmol/L    Chloride 105 100 - 111 mmol/L    CO2 27 21 - 32 mmol/L    Anion gap 6 3.0 - 18 mmol/L    Glucose 97 74 - 99 mg/dL    BUN 16 7.0 - 18 MG/DL    Creatinine 0.66 0.6 - 1.3 MG/DL    BUN/Creatinine ratio 24 (H) 12 - 20      GFR est AA >60 >60 ml/min/1.73m2    GFR est non-AA >60 >60 ml/min/1.73m2    Calcium 9.6 8.5 - 10.1 MG/DL    Bilirubin, total <0.1 (L) 0.2 - 1.0 MG/DL    ALT (SGPT) 35 16 - 61 U/L    AST (SGOT) 27 10 - 38 U/L    Alk.  phosphatase 94 45 - 117 U/L    Protein, total 6.9 6.4 - 8.2 g/dL    Albumin 3.9 3.4 - 5.0 g/dL    Globulin 3.0 2.0 - 4.0 g/dL    A-G Ratio 1.3 0.8 - 1.7     MAGNESIUM    Collection Time: 10/27/20  5:06 AM   Result Value Ref Range    Magnesium 2.2 1.6 - 2.6 mg/dL   PROTHROMBIN TIME + INR    Collection Time: 10/27/20  5:06 AM   Result Value Ref Range    Prothrombin time 12.4 11.5 - 15.2 sec    INR 0.9 0.8 - 1.2     HEMOGLOBIN A1C WITH EAG    Collection Time: 10/27/20  5:06 AM   Result Value Ref Range    Hemoglobin A1c 4.9 4.2 - 5.6 %    Est. average glucose 94 mg/dL   LIPID PANEL    Collection Time: 10/27/20  5:06 AM   Result Value Ref Range    LIPID PROFILE          Cholesterol, total 287 (H) <200 MG/DL    Triglyceride 148 <150 MG/DL    HDL Cholesterol 63 (H) 40 - 60 MG/DL    LDL, calculated 194.4 (H) 0 - 100 MG/DL    VLDL, calculated 29.6 MG/DL    CHOL/HDL Ratio 4.6 0 - 5.0     GLUCOSE, POC    Collection Time: 10/27/20 11:52 AM   Result Value Ref Range    Glucose (POC) 112 (H) 70 - 110 mg/dL

## 2020-10-27 NOTE — CONSULTS
A  77 y.o., right handed male patient with medical history of left-sided parieto-occipital glioblastoma multiforme status post resection in March 2020 and adjuvant chemoradiotherapy [April 2020] [was on temozolomide] and focal seizure with secondary generalization from his GBM and was on Keppra 1000 mg p.o. twice daily came to the emergency room for gait instability, worsening weakness, and recurrent seizures. He mentions that he has progressive difficulty with his gait him recently and he stumbles a lot. Had a seizure on October 24, 2020 and was seen in the emergency room where his Keppra dose was increased to 1000 mg p.o. twice daily. On evaluation in the emergency room he had intermittent focal seizure of the right upper extremity and right side of the face. Patient has right-sided weakness from his GBM. After surgery, he claims he had some improvement. MRI of the brain with and without contrast done yesterday showed  progression of tumor/GBM along the anterior inferior margins of the left parietal resection cavity, with increased amount of surrounding vasogenic edema. He was started on Decadron 4 mg IV every 6 hours. The dose of his Keppra was increased to 1250 mg IV twice daily. Did not have any generalized seizures overnight. But he claims he has intermittent jerking of the right upper extremity. Some worsening when he feels cold and when his extremities are out of his blanket. No fever, cough, or chest pain. No vomiting.     Social History     Socioeconomic History    Marital status:      Spouse name: Not on file    Number of children: 10    Years of education: Not on file    Highest education level: Not on file   Occupational History    Occupation:  [employer]   Social Needs    Financial resource strain: Not on file    Food insecurity     Worry: Not on file     Inability: Not on file   [employer] needs     Medical: Not on file     Non-medical: Not on file   Tobacco Use    Smoking status: Former Smoker     Packs/day: 1.00     Last attempt to quit: 2011     Years since quittin.8    Smokeless tobacco: Former User   Substance and Sexual Activity    Alcohol use: Yes     Comment: socially    Drug use: No    Sexual activity: Not on file   Lifestyle    Physical activity     Days per week: Not on file     Minutes per session: Not on file    Stress: Not on file   Relationships    Social connections     Talks on phone: Not on file     Gets together: Not on file     Attends Zoroastrian service: Not on file     Active member of club or organization: Not on file     Attends meetings of clubs or organizations: Not on file     Relationship status: Not on file    Intimate partner violence     Fear of current or ex partner: Not on file     Emotionally abused: Not on file     Physically abused: Not on file     Forced sexual activity: Not on file   Other Topics Concern    Not on file   Social History Narrative    Not on file       Family History   Problem Relation Age of Onset    Hypertension Mother     Diabetes Father     Heart Disease Father         Current Facility-Administered Medications   Medication Dose Route Frequency Provider Last Rate Last Dose    sodium chloride (NS) flush 5-40 mL  5-40 mL IntraVENous Q8H Saleem Long MD   10 mL at 10/27/20 1400    sodium chloride (NS) flush 5-40 mL  5-40 mL IntraVENous PRN Saleem Long MD        acetaminophen (TYLENOL) tablet 650 mg  650 mg Oral Q6H PRN Saleem Long MD        Or    acetaminophen (TYLENOL) suppository 650 mg  650 mg Rectal Q6H PRN Saleem Long MD        polyethylene glycol (MIRALAX) packet 17 g  17 g Oral DAILY PRN Saleem Long MD        promethazine (PHENERGAN) tablet 12.5 mg  12.5 mg Oral Q6H PRN Saleem Long MD        Or    ondansetron (ZOFRAN) injection 4 mg  4 mg IntraVENous Q6H PRN Saleem Long MD        famotidine (PEPCID) tablet 20 mg  20 mg Oral DAILY MD Renetta Cespedes dexamethasone (DECADRON) 4 mg/mL injection 4 mg  4 mg IntraVENous Q6H Toi Murphy MD   4 mg at 10/27/20 1248    levETIRAcetam (KEPPRA) 1,250 mg in 0.9% sodium chloride 100 mL IVPB  1,250 mg IntraVENous Q12H Altaf Marti MD   1,250 mg at 10/27/20 1650    LORazepam (ATIVAN) injection 1 mg  1 mg IntraVENous Q2H PRN Altaf Marti MD        insulin lispro (HUMALOG) injection   SubCUTAneous AC&HS Altaf Marti MD   Stopped at 10/27/20 1130    glucose chewable tablet 16 g  4 Tab Oral PRN Altaf Marti MD        glucagon (GLUCAGEN) injection 1 mg  1 mg IntraMUSCular PRN Altaf Marti MD        dextrose (D50W) injection syrg 12.5-25 g  25-50 mL IntraVENous PRN Altaf Marti MD        tamsulosin (FLOMAX) capsule 0.4 mg  0.4 mg Oral QHS Altaf Marti MD           Past Medical History:   Diagnosis Date    Abuse     h/o alcohol abstinent since 1/13    Asbestosis(501) 1997    dx'ed in [place], f/u xrays negative    Bilateral hearing loss     Cervical spinal stenosis 08/2016    Dt Miranda; severe C5-6 on mri    Chronic pain     DIRE score 18 from 5/16; no response gabapentin/lyrica/topamax/cymbalta; controlled substance agreement in place    Chronic prostatitis     Dr. Petey Menendez COPD 7/11    on CXR    DJD (degenerative joint disease)     c spine on mri 2004, t spine mri 10/13; saw Dr Sukhdev Grossman; intol lyrica, topamax and cymbalta; no response to gabapentin    Epididymitis     recurrent Dr Ruben Multani 10/14; also gb polyps, no stones    FHx: heart disease     GERD (gastroesophageal reflux disease)     h/o gastritis; s/p dilation 8/14 Dr Parks [place]    Hyperlipidemia     calculated 10 year risk score 12.6% (2014)    Hypovitaminosis D 7/14    Microscopic hematuria     Pancreatitis     10/14 from binge alcohol drinking; on CT 10/14 [facility]    PUD (peptic ulcer disease) 8/14    antral ulcers, gastritis, gr 3 esophagitis, esoph ring s/p dilation, neg h pylori Dr Jazlyn Tejada Recurrent kidney stones     2006 Dr. Nuno Ghotra; 2015       Past Surgical History:   Procedure Laterality Date    CARDIAC SURG PROCEDURE UNLIST  1993    thallium negative    HX CATARACT REMOVAL Bilateral 2012    HX COLONOSCOPY      Dr. Rin Calderon 2005 negative    HX HEMORRHOIDECTOMY  2005    Dr. Can Villasenor HX ORTHOPAEDIC  2006    left knee surgery/meniscus/patella in [place], [place] workmans comp    HX UROLOGICAL  4/15    [facility], Dr. Maricel Mejia, Cystoscopy, left retrograde ureteroscopy, holmium laser lithotripsy, double j catheter        Allergies   Allergen Reactions    Benadryl [Diphenhydramine Hcl] Other (comments)     Patient states he becomes hyper      Cymbalta [Duloxetine] Other (comments)    Flexeril [Cyclobenzaprine] Other (comments)     Felt bad      Gabapentin Swelling    Lipitor [Atorvastatin] Myalgia    Lyrica [Pregabalin] Other (comments)     Weight gain    Nexium [Esomeprazole Magnesium] Other (comments)     Dry eyes    Pamelor [Nortriptyline] Hives    Pravastatin Myalgia    Septra [Sulfamethoprim Ds] Other (comments)     Agitation      Sinequan [Doxepin] Other (comments)     Irritable      Topamax [Topiramate] Other (comments)     Possible kidney stones?        Patient Active Problem List   Diagnosis Code    GERD and gastritis K21.9    Hyperlipidemia E78.5    Recurrent kidney stones Dr Nuno Ghotra N20.0    Hypovitaminosis D E55.9    Chronic back pain Dr Ayad Garcia M54.9, G89.29    Arthritis, degenerative M19.90    Chronic neck pain M54.2, G89.29    Acute prostatitis N41.0    Partial seizures ([address] R56.9    Gait disturbance R26.9    Brain cancer ([address] C71.9    Seizures ([address] R56.9         Review of Systems:    Constitutional no fever or chills  Skin denies rash or itching  HENT  Denies tinnitus, hearing lose  Eyes denies diplopia vision lose  Respiratory denies shortness of breath  Cardiovascular denies chest pain, dyspnea on exertion  Gastrointestinal denies nausea, vomiting, diarrhea, constipation  Genitourinary denies incontinence  Musculoskeletal denies joint pain or swelling  Endocrine denies weight change  Hematology denies easy bruising or bleeding   Neurological as above in HPI      PHYSICAL EXAMINATION:      VITAL SIGNS:    Visit Vitals  BP (!) 148/85 (BP 1 Location: Right arm, BP Patient Position: Supine)   Pulse 71   Temp 97.9 °F (36.6 °C)   Resp 19   Ht 5' 9\" (1.753 m)   Wt 79.4 kg (175 lb 0.7 oz)   SpO2 94%   BMI 25.85 kg/m²       GENERAL: In no apparent distress. EXTREMITIES: Muscle tone is normal.  HEAD:    normocephalic. NEUROLOGIC EXAMINATION  Mental status: Awake, alert, oriented x3, follows simple and complex  commands, no neglect, no extinction to DSS or VSS  Speech and languge: fluent, coherent,and comprehension intact  CN: VFF, EOMI, PERRLA, face sensation intact , no facial asymmetry noted, palate elevation symmetric bilat, SS+SCM 5/5 bilat, tongue midline  Motor: RUE drift; normal tone; strength over the RUE is 4+/5 and RLE 4=4+/5; on the left: 5/5. Sensory: intact to light touch throughout  Coordination: FNF, accurate w/o dysmetria  DTR: 3+  Over the right knee; but 2+ in the others; plantar is down going. Gait: not tested       Result Information     Status: Final result (Exam End: 10/26/2020 18:46)  Provider Status: Open    Study Result     EXAM: MRI BRAIN W WO CONT     CLINICAL INDICATION/HISTORY: Gait instability, right arm weakness, recurrent  seizures; history of GBM with prior surgery; repeat presentation with seizures,  abnormal head CT with concern for residual/recurrent tumor.     TECHNIQUE: Multisequence multiplanar MR imaging acquired through the brain.      Contrast used: 16 cc Dotarem     COMPARISON: Current and prior head CT; also prior outside MRI from earlier 2020  within the [facility] system     FINDINGS: Degraded by patient motion artifact. Within limitations:     Parenchyma: Prior left parietal region craniotomy.  There is progression of  heterogeneously enhancing tumor at the deep anterior inferior aspect of surgical  site. This includes increased caliber of the previous identified nodular area at  the more superior anterior aspect, now about 1.9 cm diameter (series 16 image  19); more substantially, just inferior this and contiguous to it is an oblong  contiguous portion of tumor measuring 3.7 x 3.3 x 2.6 cm; shows similar  enhancement abutting posterior portion of left lateral ventricle. Previously  that area measured on the order of 2 x 1.5 cm. There is accompanying increased  extent of vasogenic edema pattern around this area. -Otherwise, surgical resection cavity with relatively thin marginal enhancement  along the ulnar margin is not described above  -No separate/skip lesions     Diffusion imaging is without territorial restriction. There is some patchy  hyperintensity associated with the areas of tumor enhancement, likely related to  cellularity. No acute infarction. No acute hemorrhage. There is some  susceptibility artifact from prior microhemorrhage along the resection cavity.     CSF spaces: Ventricles and cisterns remain midline in position. Only minor  effacement along posterior margin of left lateral ventricle.     IAC regions: Unremarkable     Parasellar region: Unremarkable     Vasculature: Appropriate flow voids within the major skull base vasculature.     Cervicomedullary junction: Patent     Orbits: Unremarkable     Paranasal sinuses: Clear; small right mastoid effusion.            IMPRESSION  IMPRESSION:     1. Compared with outside MRI of 9/10/2020, there is notable progression of  tumor/GBM along the anterior inferior margins of the left parietal resection  cavity, with increased amount of surrounding vasogenic edema  -No acute hemorrhage or herniation     2. Concordant preliminary interpretation was submitted 10/26/2020 at 1934 hours  by Dr. Shea Delacruz.                 CBC:   Lab Results   Component Value Date/Time    WBC 8.0 10/27/2020 05:06 AM    RBC 4.87 10/27/2020 05:06 AM    HGB 14.5 10/27/2020 05:06 AM    HCT 42.7 10/27/2020 05:06 AM    PLATELET 739 04/70/1457 05:06 AM     BMP:   Lab Results   Component Value Date/Time    Glucose 97 10/27/2020 05:06 AM    Sodium 138 10/27/2020 05:06 AM    Potassium 3.9 10/27/2020 05:06 AM    Chloride 105 10/27/2020 05:06 AM    CO2 27 10/27/2020 05:06 AM    BUN 16 10/27/2020 05:06 AM    Creatinine 0.66 10/27/2020 05:06 AM    Calcium 9.6 10/27/2020 05:06 AM     CMP:   Lab Results   Component Value Date/Time    Glucose 97 10/27/2020 05:06 AM    Sodium 138 10/27/2020 05:06 AM    Potassium 3.9 10/27/2020 05:06 AM    Chloride 105 10/27/2020 05:06 AM    CO2 27 10/27/2020 05:06 AM    BUN 16 10/27/2020 05:06 AM    Creatinine 0.66 10/27/2020 05:06 AM    Calcium 9.6 10/27/2020 05:06 AM    Anion gap 6 10/27/2020 05:06 AM    BUN/Creatinine ratio 24 (H) 10/27/2020 05:06 AM    Alk. phosphatase 94 10/27/2020 05:06 AM    Protein, total 6.9 10/27/2020 05:06 AM    Albumin 3.9 10/27/2020 05:06 AM    Globulin 3.0 10/27/2020 05:06 AM    A-G Ratio 1.3 10/27/2020 05:06 AM       Impression:   A 77years old male patient with left parieto-occipital GBM status post resection and chemoradiation admitted for recurrent seizure. Patient had seizures since the diagnosis of his GBM: Diagnosis of GBM was made after he had generalized tonic-clonic seizure. Was on Keppra 1000 mg p.o. twice daily. Dose was increased to 1250 mg after admission. No generalized tonic-clonic seizures overnight. But he continues to have intermittent right upper extremity jerking. Mild right hemiparesis. MRI of the brain showed progression of his left GBM with edema. On dexamethasone 4 mg every 6 hours. Following with oncology and neurosurgery at [address] [place] [address] Dustin Sw:  -Continue with Keppra 1025 mg p.o. twice daily; will increase dose if he continues to have the right upper extremity focal twitching.   Another option would be adding Vimpat.  -Continue with dexamethasone 4 mg IV every 6 hours  -We will follow EEG  -We will follow patient. PLEASE NOTE:   This document has been produced using voice recognition software. Unrecognized errors in transcription may be present.

## 2020-10-27 NOTE — PROGRESS NOTES
Problem: Self Care Deficits Care Plan (Adult)  Goal: *Acute Goals and Plan of Care (Insert Text)  Description: Occupational Therapy Goals  Initiated 10/27/2020 within 7 day(s). 1.  Patient will perform upper body dressing with independence. 2.  Patient will perform lower body dressing with supervision/set-up. 3.  Patient will perform functional activities in standing with G balance for 5 minutes with supervision/set-up. 4.  Patient will perform toilet transfers with modified independence. 5.  Patient will perform all aspects of toileting with modified independence. Prior Level of Function:  Patient reports he was independent with all ADLs without the use of AE/DME prior to admission. Patient reports having RW, Shower chair and BSC at home from previous surgery. Outcome: Progressing Towards Goal     OCCUPATIONAL THERAPY EVALUATION    Patient: Nurys Dick (40 y.o. male)  Date: 10/27/2020  Primary Diagnosis: Brain cancer ([facility] [address] [C71.9]  Partial seizures ([facility] [address] [R56.9]  Gait disturbance [R26.9]  Seizures ([facility] [address] [R56.9]        Precautions: Fall, Aspiration    ASSESSMENT :  Patient cleared by RN to participate in occupational therapy evaluation. Upon entering room patient received supine in bed with nurse present, alert and agreeable to participate in occupational therapy evaluation. Patient treated with PT to maximize patient safety, participation and functional mobility in preparation for self care. Patient reports being independent prior to admission, without any concerns regarding functional mobility or self-care. Patient motivated to get OOB to use [facility] this session. Patient modified independent - supervision during bed mobility in preparation for toileting on BSC. Prior to functional movement, patient donned fresh gown with stand-by assistance. Patient extremely impulsive this session, insisting on performing sit > stand functional transfer without AD, using bed rail for stability.   Patient contact guard assistance from bed > BSC this session, with patient observed bracing knees on bed once 2/2 LOB. Patient requesting privacy during toileting, instructed to let therapists know when finished. Patient observed standing without assistance from [facility] to pull underpants up, contact guard assistance from [facility] back to EOB. Once seated EOB patient demonstrated ability to don/doff socks with stand-by assistance. Patient agreeable to sit up in chair, contact guard assistance during functional mobility around room to chair. Patient demonstrating lack of insight into deficits as well as poor safety awareness during functional movement about room this session. Patient observed rushing ahead of therapist and IV pole, at one point kicking IV pole out of way to move ahead of it faster. Patient required maximum verbal cues for safety to manage IV prior to sitting in chair. Per RN request patient left sitting up in chair at end of session. Patient reports kicking things out of his way at home to prevent falls. Patient left seated in chair with chair alarm donned, call bell and phone within reach. Based on the objective data described below, the patient presents with increased impulsivity, decreased balance, decreased independence, and decreased functional mobility. Patient will benefit from skilled intervention to address the above impairments.   Patient's rehabilitation potential is considered to be Good  Factors which may influence rehabilitation potential include:   []             None noted  []             Mental ability/status  [x]             Medical condition  []             Home/family situation and support systems  [x]             Safety awareness  []             Pain tolerance/management  []             Other:      PLAN :  Recommendations and Planned Interventions:   [x]               Self Care Training                  [x]      Therapeutic Activities  [x]               Functional Mobility Training   []      Cognitive Retraining  [x]               Therapeutic Exercises           [x]      Endurance Activities  [x]               Balance Training                    []      Neuromuscular Re-Education  []               Visual/Perceptual Training     [x]      Home Safety Training  [x]               Patient Education                   []      Family Training/Education  []               Other (comment):    Frequency/Duration: Patient will be followed by occupational therapy 1-2 times per day/4-7 days per week to address goals. Discharge Recommendations: Home Health and 24/7 supervision  Further Equipment Recommendations for Discharge: Patient has all necessary  DME. SUBJECTIVE:   Patient stated I can do it all myself, I am active usually walk 20,000 steps.     OBJECTIVE DATA SUMMARY:     Past Medical History:   Diagnosis Date    Abuse     h/o alcohol abstinent since 1/13    Asbestosis(501) 1997    dx'ed in MD, f/u xrays negative    Bilateral hearing loss     Cervical spinal stenosis 08/2016    Dt Ruth; severe C5-6 on mri    Chronic pain     DIRE score 18 from 5/16; no response gabapentin/lyrica/topamax/cymbalta; controlled substance agreement in place    Chronic prostatitis     Dr. Elizabeth Davis COPD 7/11    on CXR    DJD (degenerative joint disease)     c spine on mri 2004, t spine mri 10/13; saw Dr Carmona Holstein; intol lyrica, topamax and cymbalta; no response to gabapentin    Epididymitis     recurrent Dr Taylor Story 10/14; also gb polyps, no stones    FHx: heart disease     GERD (gastroesophageal reflux disease)     h/o gastritis; s/p dilation 8/14 Dr Caro Amaya    Hyperlipidemia     calculated 10 year risk score 12.6% (2014)    Hypovitaminosis D 7/14    Microscopic hematuria     Pancreatitis     10/14 from binge alcohol drinking; on CT 10/14 [facility]    PUD (peptic ulcer disease) 8/14    antral ulcers, gastritis, gr 3 esophagitis, esoph ring s/p dilation, neg h pylori Dr Caro Amaya    Recurrent kidney stones 2006 Dr. Jigar Buckley; 2015     Past Surgical History:   Procedure Laterality Date    CARDIAC SURG PROCEDURE UNLIST  1993    thallium negative    HX CATARACT REMOVAL Bilateral 2012    HX COLONOSCOPY      Dr. Shayy Bradley 2005 negative    HX HEMORRHOIDECTOMY  2005    Dr. Aldana  HX ORTHOPAEDIC  2006    left knee surgery/meniscus/patella in [place], [place] workmans comp    HX UROLOGICAL  4/15    [facility], Dr. Reanna Jamil, Cystoscopy, left retrograde ureteroscopy, holmium laser lithotripsy, double j catheter      Barriers to Learning/Limitations: None  Compensate with: visual, verbal, tactile, kinesthetic cues/model    Home Situation:   Home Situation  Home Environment: Private residence  Support Systems: Spouse/Significant Other/Partner  Patient Expects to be Discharged to[de-identified] Private residence  Current DME Used/Available at Home: Commode, bedside, Walker, rolling, Shower chair  Tub or Shower Type: Tub/Shower combination  [x]  Right hand dominant   []  Left hand dominant    Cognitive/Behavioral Status:  Neurologic State: Alert  Orientation Level: Oriented X4  Cognition: Decreased attention/concentration;Decreased command following; Impulsive;Poor safety awareness  Safety/Judgement: Home safety; Fall prevention;Decreased awareness of need for safety;Decreased awareness of need for assistance;Decreased insight into deficits    Skin: Visible skin appears intact  Edema: None no blanca    Vision/Perceptual:      Acuity: Within Defined Limits         Coordination: BUE     Fine Motor Skills-Upper: Left Intact; Right Intact    Gross Motor Skills-Upper: Left Intact; Right Intact    Balance:  Sitting: Impaired  Sitting - Static: Good (unsupported)  Sitting - Dynamic: Fair (occasional)  Standing: Impaired  Standing - Static: Fair(+)  Standing - Dynamic : Fair    Strength: BUE    Strength: Generally decreased, functional      Tone & Sensation: BUE    Tone: Normal  Sensation: Intact      Range of Motion: BUE    AROM: Generally decreased, functional    Functional Mobility and Transfers for ADLs:  Bed Mobility:  Rolling: Modified independent  Supine to Sit: Modified independent     Scooting: Supervision  Transfers:  Sit to Stand: Contact guard assistance  Stand to Sit: Contact guard assistance     Bed to Chair: Contact guard assistance      ADL Assessment:   Feeding: Independent    Oral Facial Hygiene/Grooming: Independent    Bathing: Contact guard assistance    Upper Body Dressing: Stand-by assistance    Lower Body Dressing: Stand-by assistance;Contact guard assistance(CGA(standing))    Toileting: Contact guard assistance      ADL Intervention:       Grooming  Grooming Assistance: Independent  Position Performed: Seated edge of bed  Brushing/Combing Hair: Independent(patient simulated seated EOB)      Upper Body Dressing Assistance  Dressing Assistance: Stand-by assistance  Hospital Gown: Stand-by assistance    Lower Body Dressing Assistance  Dressing Assistance: Stand-by assistance  Socks: Stand-by assistance  Leg Crossed Method Used: No  Position Performed: Seated edge of bed    Toileting  Toileting Assistance: Contact guard assistance  Clothing Management: Contact guard assistance    Cognitive Retraining  Safety/Judgement: Home safety; Fall prevention;Decreased awareness of need for safety;Decreased awareness of need for assistance;Decreased insight into deficits    Pain:  Pain level pre-treatment: -/10   Pain level post-treatment: -/10   Pain Intervention(s): Medication (see MAR)  Response to intervention: Nurse notified, See doc flow    Activity Tolerance:   Good    Please refer to the flowsheet for vital signs taken during this treatment.   After treatment:   [x] Patient left in no apparent distress sitting up in chair  [] Patient left in no apparent distress in bed  [x] Call bell left within reach  [x] Nursing notified  [] Caregiver present  [x] Chair alarm activated    COMMUNICATION/EDUCATION:   [x] Role of Occupational Therapy in the acute care setting  [x] Home safety education was provided and the patient/caregiver indicated understanding. [x] Patient/family have participated as able in goal setting and plan of care. [x] Patient/family agree to work toward stated goals and plan of care. [] Patient understands intent and goals of therapy, but is neutral about his/her participation. [] Patient is unable to participate in goal setting and plan of care. Thank you for this referral.  Irma Kenney  Time Calculation: 26 mins     A student participated in this treatment session. Per CMS Medicare statements and guidelines I certify that the following was true:     1. I was present and directly observed the entire session. 2. I made all skilled judgments and clinical decisions regarding care. 3. I am the practitioner responsible for assessment, treatment, and documentation. Thank you,  Elisa Souza MS, OTR/L    Kassandra Complexity: History: MEDIUM Complexity : Expanded review of history including physical, cognitive and psychosocial  history ; Examination: MEDIUM Complexity : 3-5 performance deficits relating to physical, cognitive , or psychosocial skils that result in activity limitations and / or participation restrictions; Decision Making:MEDIUM Complexity : Patient may present with comorbidities that affect occupational performnce.  Miniml to moderate modification of tasks or assistance (eg, physical or verbal ) with assesment(s) is necessary to enable patient to complete evaluation

## 2020-10-27 NOTE — PROGRESS NOTES
2000: Assumed patient care from [facility]. Patient is alert and oriented to person, place, time and situation, but is extremely impulsive and has poor safety awareness. Patient refuses all medications. He also refuses to call for assistance, wear a telemetry box or allow this nurse to assess him. Respiratory status is stable on room air. Vital signs are stable. MEWS score is a one. Patient denies any pain, discomfort, nausea vomiting dizziness or anxiety. White board and fall card is updated. Bed is locked and in lowest position. Call bell, water and personal belongings are within reach. Patient has no questions, comments or concerns after bedside shift report. 2010: Patient ripped out his peripheral IV site. This nurse attempted to establish a patent IV, but the patient refused. 2015: After getting permission from the patient, this nurse spoke with the patient's daughter. The daughter was informed of her Father's noncompliance. This nurse asked the patient's daughter to speak with him in the hope that it may compel him to be compliant with his plan of treatment and safety protocols. The patient's daughter explained to this nurse that this is the patient's baseline behavior at home, but she would call him and speak with him. 2030: Patient was found out of bed, attempting to ambulate in his room after the bed alarm went off. Patient was returned to bed and reminded to call for assistance before getting out of bed. The patient's call bell, water, urinal and personal belongings are within reach. The bed is locked and in the lowest position. The patient's bed alarm is on. He is wearing his non-skid footwear and fall bractlet. Patient is resting quetly in bed at this time with no signs of distress noted. 2100: Patient was found on the floor after his bed alarm went off. Patient stated that he was trying to get a fork off the floor.  This nurse attempted to assess the patient post fall, but the patient refused his assessment and vital signs. The patient was returned to bedand reminded to call for assistance before getting out of bed. The patient's call bell, water, urinal and personal belongings are within reach. The bed is locked and in the lowest position. The patient's bed alarm is on. He is wearing his non-skid footwear and fall bractlet. Patient is resting quetly in bed at this time with no signs of distress noted. The Charge Nurse, Nursing Supervisor and Hospitalist were informed of the fall and subsequent refusal of assessment. No new orders were received at this time. 2215: Patient was found out of bed, attempting to ambulate in his room after the bed alarm went off. Patient was returned to bed and reminded to call for assistance before getting out of bed. The patient's call bell, water, urinal and personal belongings are within reach. The bed is locked and in the lowest position. The patient's bed alarm is on. He is wearing his non-skid footwear and fall bractlet. Patient is resting quetly in bed at this time with no signs of distress noted. 2335: Patient was found attempting to get out of bed after the bed alarm went off. He appeared to be having a seizure. Patient was returned to bed and assessed. After establishing a patent IV site,one milligram of IV ativan was given for the seizure activity. The patient's call bell, water, urinal and personal belongings are within reach. The bed is locked and in the lowest position. The patient's bed alarm is on. He is wearing his non-skid footwear and fall bractlet. Patient is resting quetly in bed at this time with no signs of distress noted. 0030: Patient is resting quietly in bed with no seizure activity noted. 0330: Patient was found out of bed, attempting to ambulate in his room after the bed alarm went off. Patient was returned to bed and reminded to call for assistance before getting out of bed.  The patient's call bell, water, urinal and personal belongings are within reach. The bed is locked and in the lowest position. The patient's bed alarm is on. He is wearing his non-skid footwear and fall bractlet. Patient is resting quetly in bed at this time with no signs of distress noted. 0700: Patient continued to be non-compliant with the treatment plan. He was resting quietly with no signs of distress noted. Bed locked and in lowest position. Call bell water and personal belongings were within reach. Patient had no questions, comments or concerns after bedside shift report.  Bedside report given to Eugenio Jasmine R.N.

## 2020-10-27 NOTE — PROGRESS NOTES
PT order received and chart reviewed. Patient currently has an active bedrest order. Please discontinue bedrest order for full participation in skilled PT evaluation/treatment. Will follow up as patient schedule allows.      Thank you for the referral.    Vincent Higuera, PT, DPT

## 2020-10-27 NOTE — PROGRESS NOTES
Reason for Admission:  Brain cancer ([facility] [address] [C71.9]  Partial seizures ([facility] [address] [R56.9]  Gait disturbance [R26.9]  Seizures ([facility] [address] [R56.9]                 RUR Score:    11            Plan for utilizing home health:    Patient declined, \"I don't need that, what can they do, I have a brain tumor. \"                      Likelihood of Readmission:   LOW                         Transition of Care Plan:              Initial assessment completed with patient. Cognitive status of patient: oriented to time, place, person and situation. Face sheet information confirmed:  yes. The patient designates spouse to participate in his discharge plan and to receive any needed information. This patient lives in a single family home with patient and spouse. Patient is able to navigate steps as needed. Prior to hospitalization, patient was considered to be independent with ADLs/IADLS : yes . Patient has a current ACP document on file: no  The patient and spouse will be available to transport patient home upon discharge. The patient already has [facility] equipment available in the home. Patient is not currently active with home health. Patient has not stayed in a skilled nursing facility or rehab. This patient is on dialysis :no    List of available Home Health agencies were provided and reviewed with the patient prior to discharge. Freedom of choice signed: no, for PATIENT DECLINED. Currently, the discharge plan is Home. The patient states that he can obtain his medications from the pharmacy, and take his medications as directed.     Patient's current insurance is Medicare and [facility]       Care Management Interventions  PCP Verified by CM: Yes(pt refused a PCP)  Mode of Transport at Discharge: Self  Current Support Network: Lives with Spouse  Confirm Follow Up Transport: Family  The Plan for Transition of Care is Related to the Following Treatment Goals : Home  Discharge Location  Discharge Placement: Home with family assistance        Melany Armendariz RN BSN  Care Manager  [phone]'

## 2020-10-27 NOTE — PROGRESS NOTES
I was called to notify that patient refused his care and refused telemetry box. He was later found on the floor by nursing. According to patient, he dropped his phone on the floor. He got down to his knees to  the phone but was unable to get up on his own. Nursing was able to help him up. Report now pain. No injury no examination. Advised patient to allow nursing staffs to care for him per his MD order. He declined at this point. Will continue to monitor for now.      Anahi Orozco MD

## 2020-10-27 NOTE — PROGRESS NOTES
[facility]  Fall Assessment Note    Patient: Laci Anaya MRN: [id number]   SSN: [id number]  YOB: 1945   Age: 77 y.o. Sex: male    Admit date: 10/26/2020 Length of stay:  LOS: 0 days    PCP: None PP: <principal problem not specified>     Subjective:   Called to bedside by nursing staff for fall evaluation. Pt was in bed, reached over to the bedside table, and rolled onto floor. No head trauma. The only pain he reports is lower back pain, which was present before falling. He attributes it to lying on an MRI table. Denies new numbness/tingling, paresthesias, bowel/urinary incontinence, or weakness. Objective:   Vital Signs:  Visit Vitals  BP (!) 143/89   Pulse 66   Temp 97.5 °F (36.4 °C)   Resp 18   SpO2 94%       Physical Exam:  General appearance: alert, cooperative, no distress, appears stated age  Lungs: clear to auscultation bilaterally  Heart: regular rate and rhythm, S1, S2 normal, no murmur, click, rub or gallop  Abdomen: soft, non-tender. Bowel sounds normal. No masses,  no organomegaly  Pulses: 2+ and symmetric  Skin: Skin color, texture, turgor normal. No rashes or lesions  Neuro:  speech normal, alert and oriented x iii  PIYUSH  Jerky movements of RUE  Follows commands, responds appropriately      Assessment and Plan:   77 y.o. yo male admitted for recurrent seizure s/p fall in house. Patient was without complications caused from this event. Per patient report, nursing, and my assessment, patient is stable and at baseline condition as prior to the fall. There are no neurologic findings on my physical exam, with cranial nerves II-XII grossly intact, no neck tenderness to palpation, nor are there structural abnormalities. Pt reports no change in mental status/function and a mini mental status exam was without abnormality. Pt has baseline ROM, muscle strength, and is without new paraesthesias.  Lastly, there is no lesion or gross trauma with inspection of the skin and area of contact with the floor. It is this writers assessment that the patient is without complications caused from this event. I recommend the patient be on fall precautions, if agreed upon by the primary physician. Nursing has been instructed to notify the attending of this event and my clearance of this patient.      Bashir Noel MD, PGY-1   [facility] Medicine   Senior Pager: [phone]   October 26, 2020, 9:39 PM

## 2020-10-27 NOTE — ACP (ADVANCE CARE PLANNING)
Advanced Steps Advance Care Planning Conversation  Date of conversation: 10/27/2020   [address] [place] [address] Street  Length (minutes): 50    CODE STATUS- FULL CODE/FULL INTERVENTIONS     Participants:   [x] Patient    [] Healthcare agent (Next of Kin)    Name:Rachna Mederos     Relationship to Patient:spouse     Phone [phone]    Advanced Steps® ACP Facilitator: Kaz Gomez RN, Chris Mendes, NP       Conversation Topics   Patient response  Understanding of Medical Condition/s AND Potential Complications: \" I have a brain tumor\". \"I know Im going to die, but my wife is not ready\" . Presented the benefits and burdens of CPR and Intubation if his heart and breathing stopped, also intubation for respiratory distress. Pt was educated on the 4 components of CPR. That was \"surprising\"to him when the full picture of CPR with intubation was presented. Lesson [place] from Experiences Related to Serious Illness: Pt has experience of the death of his 1st wife from breast cancer. Identifies the following as important for living well: The fight as hard as I can for my children if that is what they need. \"I am willing to suffer for them\". Hopes: \"That he can sit down with all his family and have an open talk about this\"       Worries/Fears about Medical Condition:That I will loose the chance to be mentally alert to have these discussions with them. Cultural, Restoration, spiritual, or personal beliefs described as: expressed gratitude for the visit with the Jses Mario. Also our candida approach the goals of care conversation. Needs to discuss with spiritual/Restoration advisor: [] Yes  [x] No    Needs more information about illness and complications:  [x] Yes  [] No- awaiting to see reports from neuro.        The following was provided (check all that apply):      Healthcare Decision Information Cards:   [] Help with Breathing Facts   [] Tube Feeding Facts   [] CPR Facts           Meeting Outcomes: If ACP discussion not completed, last interview topic discussed: Continue to support pt in his conversations with his family. Explore with pt the completion of AMD and POST form. Provide pt with CPR, Breathing facts and intubation information.       Follow-up plan:     [x] Schedule follow-up conversation to continue planning   [] Referred individual to Provider for additional questions/concerns   [] Advised patient/agent/surrogate to review completed POST form and update if needed with changes in condition, patient preferences or care setting     [] This note routed to one or more involved healthcare providers    Torin Zhang RN

## 2020-10-27 NOTE — CONSULTS
Palliative Medicine Consult    Patient Name: Marlene Torre  YOB: 1945    Date of Initial Consult: 10/27/2020  Reason for Consult: Goals of care discussions  Requesting Provider: Marielle Garduno DO  Primary Care Physician: None      SUMMARY:   Marlene Torre is a 77 y. o. with a past history of  glioblastoma multiform who underwent resection in February 2020 at [facility], bilateral hearing loss, COPD, hyperlipidemia, and PUD, who was admitted on 10/26/2020 from home with a diagnosis of recurrent seizures, gait disturbance, and Glioblastoma multiforme. Current medical issues leading to Palliative Medicine involvement include: support and care decisions. PALLIATIVE DIAGNOSES:   1. Goals of care discussions  2. Glioblastoma multiforme  3. Recurrent seizures  4. Debility        PLAN:   1. Goals of care discussions: Palliative team including TROY Reese RN and I met with patient at patient's bedside. Patient is awake, alert, and oriented x 4. Introduced our role as palliative, and patient was able to tell us why he is in the hospital. Patient has no AMD on file, he is , and has 9 children total. Patient interested in AMD completion and would like to name one of his sons as primary MPOA, but wants to talk with him first to make sure he is accepting of this role. He admits his wife is \"very private and won't talk about the disease because if she talks about it then it will become reality. \" Will follow up to determine willingness for AMD completion. Discussed the benefits and burdens of CPR in the event of cardiopulmonary arrest in the setting of advanced age, lung disease, and  Glioblastoma multiforme.  Discussed the benefits and burdens of intubation in the event of respiratory distress pre-arrest. Patient admits he would prefer to die a natural death without CPR or life support, but he wants to talk to his wife and all 9 of his children around a table before making this firm decision. Patient understands that in the absence of decisions, he remains full code with full interventions. He states that he would be willing to undergo CPR and intubation if that's what his family wanted him to do, recognizing that these interventions may serve only to prolong the dying process in his case. \"I would do anything for my children, even if it means suffering. \" Agrees to review CPR fact sheet that we will provide him so he and family can be well educated on the benefits and burdens of these interventions. He recognizes that he may not have much time cognitively to have these conversations so he will discuss this ASAP with family once he is discharged. Offered to call wife and family to assist with these conversations but he respectfully declined stating \"Let me talk to them first and introduce the idea of talking to you and then maybe you can call. \" Support offered to patient. Will continue to follow with you. 2. Glioblastoma multiforme: Oncology following. underwent resection in February 2020 at [facility], received chemoradiotherapy. Poor appetite on chemotherapy, but he is \"forcing\" himself to eat. 3. Recurrent seizures: Neurology consult noted. On Keppra. Lorazepam prn. EEG pending results. 4. Debility: Jolie Moses last night. Gait disturbance secondary to number 2. Makes It clear that he is a very independent person and the idea of depending on others is not a good feeling. Per CM, declines home health. 5. Initial consult note routed to primary continuity provider  6.  Communicated plan of care with: Palliative IDT, patient, CM       GOALS OF CARE / TREATMENT PREFERENCES:   [====Goals of Care====]  GOALS OF CARE: Full code with full interventions   Patient/Health Care Proxy Stated Goals: Prolong life      TREATMENT PREFERENCES:   Code Status: Full Code    Advance Care Planning:  Advance Care Planning 4/27/2015   Confirm Advance Directive None       Medical Interventions: Full interventions           The palliative care team has discussed with patient / health care proxy about goals of care / treatment preferences for patient.  [====Goals of Care====]         HISTORY:     History obtained from: patient, chart    CHIEF COMPLAINT: difficult to hear     HPI/SUBJECTIVE:    The patient is:   [x] Verbal and participatory  [] Non-participatory due to:   Oriented x 4    Clinical Pain Assessment (nonverbal scale for severity on nonverbal patients):   Clinical Pain Assessment  Severity: 2            FUNCTIONAL ASSESSMENT:     Palliative Performance Scale (PPS):  PPS: 50       PSYCHOSOCIAL/SPIRITUAL SCREENING:     Advance Care Planning:  Advance Care Planning 4/27/2015   Confirm Advance Directive None        Any spiritual / Methodist concerns:  [] Yes /  [x] No    Caregiver Burnout:  [] Yes /  [] No /  [x] No Caregiver Present      Anticipatory grief assessment:   [x] Normal  / [] Maladaptive          REVIEW OF SYSTEMS:     Positive and pertinent negative findings in ROS are noted above in HPI. The following systems were [x] reviewed / [] unable to be reviewed as noted in HPI  Other findings are noted below. Systems: constitutional, ears/nose/mouth/throat, respiratory, gastrointestinal, genitourinary, musculoskeletal, integumentary, neurologic, psychiatric, endocrine. Positive findings noted below. Modified ESAS Completed by: provider           Pain: 2   Anxiety: 0 Nausea: 0     Dyspnea: 0     Constipation: No     Stool Occurrence(s): 0        PHYSICAL EXAM:     From RN flowsheet:  Wt Readings from Last 3 Encounters:   10/26/20 79.4 kg (175 lb 0.7 oz)   10/24/20 79.4 kg (175 lb)   04/13/20 80.7 kg (178 lb)     Blood pressure (!) 148/85, pulse 71, temperature 97.9 °F (36.6 °C), resp. rate 19, height 5' 9\" (1.753 m), weight 79.4 kg (175 lb 0.7 oz), SpO2 94 %.     Pain Scale 1: Numeric (0 - 10)  Pain Intensity 1: 0                     Constitutional: Awake, alert, NAD  ENMT: no nasal discharge, moist mucous membranes  Cardiovascular:  distal pulses intact  Respiratory: breathing not labored, symmetric  Skin: warm, dry  Neurologic: following commands, moving all extremities  Psychiatric: full affect, no hallucinations         HISTORY:     Principal Problem:    Seizures ([address] (10/26/2020)    Active Problems:    Partial seizures ([address] (10/26/2020)      Gait disturbance (10/26/2020)      Brain cancer ([address] (10/26/2020)      Past Medical History:   Diagnosis Date    Abuse     h/o alcohol abstinent since 1/13    Asbestosis(501) 1997    dx'ed in [place], f/u xrays negative    Bilateral hearing loss     Cervical spinal stenosis 08/2016    Dt Ruth; severe C5-6 on mri    Chronic pain     DIRE score 18 from 5/16; no response gabapentin/lyrica/topamax/cymbalta; controlled substance agreement in place    Chronic prostatitis     Dr. Nelson Hicks COPD 7/11    on CXR    DJD (degenerative joint disease)     c spine on mri 2004, t spine mri 10/13; saw Dr Jose Contreras; intol lyrica, topamax and cymbalta; no response to gabapentin    Epididymitis     recurrent Dr Hattie Roque 10/14; also gb polyps, no stones    FHx: heart disease     GERD (gastroesophageal reflux disease)     h/o gastritis; s/p dilation 8/14 Dr Marina Pinzon    Hyperlipidemia     calculated 10 year risk score 12.6% (2014)    Hypovitaminosis D 7/14    Microscopic hematuria     Pancreatitis     10/14 from binge alcohol drinking; on CT 10/14 [facility]    PUD (peptic ulcer disease) 8/14    antral ulcers, gastritis, gr 3 esophagitis, esoph ring s/p dilation, neg h pylori Dr Marina Pinzon    Recurrent kidney stones     2006 Dr. Figueroa Ferguson; 2015      Past Surgical History:   Procedure Laterality Date    CARDIAC SURG PROCEDURE UNLIST  1993    thallium negative    HX CATARACT REMOVAL Bilateral 2012    HX COLONOSCOPY      Dr. Ruby Quan 2005 negative    HX HEMORRHOIDECTOMY  2005    Dr. Shobha Avilez  2006    left knee surgery/meniscus/patella in [place] workmans comp    HX UROLOGICAL  4/15    [facility], Dr. Radha Zelaya, Cystoscopy, left retrograde ureteroscopy, holmium laser lithotripsy, double j catheter       Family History   Problem Relation Age of Onset    Hypertension Mother     Diabetes Father     Heart Disease Father       History reviewed, no pertinent family history. Social History     Tobacco Use    Smoking status: Former Smoker     Packs/day: 1.00     Last attempt to quit: 2011     Years since quittin.8    Smokeless tobacco: Former User   Substance Use Topics    Alcohol use: Yes     Comment: socially     Allergies   Allergen Reactions    Benadryl [Diphenhydramine Hcl] Other (comments)     Patient states he becomes hyper      Cymbalta [Duloxetine] Other (comments)    Flexeril [Cyclobenzaprine] Other (comments)     Felt bad      Gabapentin Swelling    Lipitor [Atorvastatin] Myalgia    Lyrica [Pregabalin] Other (comments)     Weight gain    Nexium [Esomeprazole Magnesium] Other (comments)     Dry eyes    Pamelor [Nortriptyline] Hives    Pravastatin Myalgia    Septra [Sulfamethoprim Ds] Other (comments)     Agitation      Sinequan [Doxepin] Other (comments)     Irritable      Topamax [Topiramate] Other (comments)     Possible kidney stones?       Current Facility-Administered Medications   Medication Dose Route Frequency    sodium chloride (NS) flush 5-40 mL  5-40 mL IntraVENous Q8H    sodium chloride (NS) flush 5-40 mL  5-40 mL IntraVENous PRN    acetaminophen (TYLENOL) tablet 650 mg  650 mg Oral Q6H PRN    Or    acetaminophen (TYLENOL) suppository 650 mg  650 mg Rectal Q6H PRN    polyethylene glycol (MIRALAX) packet 17 g  17 g Oral DAILY PRN    promethazine (PHENERGAN) tablet 12.5 mg  12.5 mg Oral Q6H PRN    Or    ondansetron (ZOFRAN) injection 4 mg  4 mg IntraVENous Q6H PRN    famotidine (PEPCID) tablet 20 mg  20 mg Oral DAILY    dexamethasone (DECADRON) 4 mg/mL injection 4 mg  4 mg IntraVENous Q6H    levETIRAcetam (KEPPRA) 1,250 mg in 0.9% sodium chloride 100 mL IVPB  1,250 mg IntraVENous Q12H    LORazepam (ATIVAN) injection 1 mg  1 mg IntraVENous Q2H PRN    insulin lispro (HUMALOG) injection   SubCUTAneous AC&HS    glucose chewable tablet 16 g  4 Tab Oral PRN    glucagon (GLUCAGEN) injection 1 mg  1 mg IntraMUSCular PRN    dextrose (D50W) injection syrg 12.5-25 g  25-50 mL IntraVENous PRN    tamsulosin (FLOMAX) capsule 0.4 mg  0.4 mg Oral QHS          LAB AND IMAGING FINDINGS:     Lab Results   Component Value Date/Time    WBC 8.0 10/27/2020 05:06 AM    HGB 14.5 10/27/2020 05:06 AM    PLATELET 872 07/47/9415 05:06 AM     Lab Results   Component Value Date/Time    Sodium 138 10/27/2020 05:06 AM    Potassium 3.9 10/27/2020 05:06 AM    Chloride 105 10/27/2020 05:06 AM    CO2 27 10/27/2020 05:06 AM    BUN 16 10/27/2020 05:06 AM    Creatinine 0.66 10/27/2020 05:06 AM    Calcium 9.6 10/27/2020 05:06 AM    Magnesium 2.2 10/27/2020 05:06 AM      Lab Results   Component Value Date/Time    Alk. phosphatase 94 10/27/2020 05:06 AM    Protein, total 6.9 10/27/2020 05:06 AM    Albumin 3.9 10/27/2020 05:06 AM    Globulin 3.0 10/27/2020 05:06 AM     Lab Results   Component Value Date/Time    INR 0.9 10/27/2020 05:06 AM    Prothrombin time 12.4 10/27/2020 05:06 AM    aPTT 28.2 02/20/2020 04:23 PM      No results found for: IRON, FE, TIBC, IBCT, PSAT, FERR   No results found for: PH, PCO2, PO2  No components found for: GLPOC   No results found for: CPK, CKMB             Total time: 70 minutes  Counseling / coordination time, spent as noted above: 60 minutes  > 50% counseling / coordination?:  Yes, patient, CM    Prolonged service was provided for  []30 min   []75 min in face to face time in the presence of the patient, spent as noted above. Time Start:   Time End:   Note: this can only be billed with 54331 (initial) or 89883 (follow up). If multiple start / stop times, list each separately.

## 2020-10-27 NOTE — PROGRESS NOTES
Bedside shift change report given to Sridevi Stephens (oncoming nurse) by 86 Yuly Ortiz (offgoing nurse). Report included the following information SBAR, Kardex and MAR.

## 2020-10-27 NOTE — PROGRESS NOTES
Problem: Falls - Risk of  Goal: *Absence of Falls  Outcome: Progressing Towards Goal  Note: Fall Risk Interventions:  Mobility Interventions: Bed/chair exit alarm         Medication Interventions: Bed/chair exit alarm    Elimination Interventions: Bed/chair exit alarm, Call light in reach    History of Falls Interventions: Bed/chair exit alarm         Problem: Patient Education: Go to Patient Education Activity  Goal: Patient/Family Education  Outcome: Progressing Towards Goal

## 2020-10-28 VITALS
WEIGHT: 175.04 LBS | OXYGEN SATURATION: 97 % | TEMPERATURE: 98 F | SYSTOLIC BLOOD PRESSURE: 123 MMHG | HEART RATE: 80 BPM | RESPIRATION RATE: 18 BRPM | HEIGHT: 69 IN | BODY MASS INDEX: 25.93 KG/M2 | DIASTOLIC BLOOD PRESSURE: 79 MMHG

## 2020-10-28 LAB
GLUCOSE BLD STRIP.AUTO-MCNC: 122 MG/DL (ref 70–110)
GLUCOSE BLD STRIP.AUTO-MCNC: 166 MG/DL (ref 70–110)

## 2020-10-28 PROCEDURE — 97530 THERAPEUTIC ACTIVITIES: CPT

## 2020-10-28 PROCEDURE — 97116 GAIT TRAINING THERAPY: CPT

## 2020-10-28 PROCEDURE — 74011250636 HC RX REV CODE- 250/636: Performed by: EMERGENCY MEDICINE

## 2020-10-28 PROCEDURE — 99239 HOSP IP/OBS DSCHRG MGMT >30: CPT | Performed by: FAMILY MEDICINE

## 2020-10-28 PROCEDURE — 74011636637 HC RX REV CODE- 636/637: Performed by: EMERGENCY MEDICINE

## 2020-10-28 PROCEDURE — 74011250637 HC RX REV CODE- 250/637: Performed by: EMERGENCY MEDICINE

## 2020-10-28 PROCEDURE — 74011000258 HC RX REV CODE- 258: Performed by: EMERGENCY MEDICINE

## 2020-10-28 PROCEDURE — 97535 SELF CARE MNGMENT TRAINING: CPT

## 2020-10-28 PROCEDURE — 2709999900 HC NON-CHARGEABLE SUPPLY

## 2020-10-28 PROCEDURE — 82962 GLUCOSE BLOOD TEST: CPT

## 2020-10-28 RX ORDER — LORAZEPAM 2 MG/ML
1 INJECTION INTRAMUSCULAR
Status: DISCONTINUED | OUTPATIENT
Start: 2020-10-28 | End: 2020-10-28 | Stop reason: HOSPADM

## 2020-10-28 RX ORDER — DEXAMETHASONE 4 MG/1
4 TABLET ORAL EVERY 8 HOURS
Status: DISCONTINUED | OUTPATIENT
Start: 2020-10-28 | End: 2020-10-28 | Stop reason: HOSPADM

## 2020-10-28 RX ORDER — LEVETIRACETAM 1000 MG/1
1000 TABLET ORAL 2 TIMES DAILY
Qty: 60 TAB | Refills: 0 | Status: SHIPPED | OUTPATIENT
Start: 2020-10-28 | End: 2020-11-27

## 2020-10-28 RX ORDER — LEVETIRACETAM 250 MG/1
250 TABLET ORAL 2 TIMES DAILY
Qty: 60 TAB | Refills: 0 | Status: SHIPPED | OUTPATIENT
Start: 2020-10-28

## 2020-10-28 RX ORDER — LORAZEPAM 1 MG/1
1 TABLET ORAL
Qty: 15 TAB | Refills: 0 | Status: SHIPPED | OUTPATIENT
Start: 2020-10-28

## 2020-10-28 RX ADMIN — Medication 10 ML: at 05:18

## 2020-10-28 RX ADMIN — ACETAMINOPHEN 650 MG: 325 TABLET ORAL at 05:40

## 2020-10-28 RX ADMIN — DEXAMETHASONE SODIUM PHOSPHATE 4 MG: 4 INJECTION, SOLUTION INTRA-ARTICULAR; INTRALESIONAL; INTRAMUSCULAR; INTRAVENOUS; SOFT TISSUE at 12:27

## 2020-10-28 RX ADMIN — INSULIN LISPRO 2 UNITS: 100 INJECTION, SOLUTION INTRAVENOUS; SUBCUTANEOUS at 12:20

## 2020-10-28 RX ADMIN — DEXAMETHASONE SODIUM PHOSPHATE 4 MG: 4 INJECTION, SOLUTION INTRA-ARTICULAR; INTRALESIONAL; INTRAMUSCULAR; INTRAVENOUS; SOFT TISSUE at 05:16

## 2020-10-28 RX ADMIN — ACETAMINOPHEN 650 MG: 325 TABLET ORAL at 10:34

## 2020-10-28 RX ADMIN — SODIUM CHLORIDE 1250 MG: 900 INJECTION, SOLUTION INTRAVENOUS at 05:16

## 2020-10-28 NOTE — PROGRESS NOTES
Problem: Falls - Risk of  Goal: *Absence of Falls  Outcome: Progressing Towards Goal  Note: Fall Risk Interventions:  Mobility Interventions: Bed/chair exit alarm         Medication Interventions: Patient to call before getting OOB, Bed/chair exit alarm    Elimination Interventions: Bed/chair exit alarm, Call light in reach    History of Falls Interventions: Bed/chair exit alarm

## 2020-10-28 NOTE — PROGRESS NOTES
Problem: Self Care Deficits Care Plan (Adult)  Goal: *Acute Goals and Plan of Care (Insert Text)  Description: Occupational Therapy Goals  Initiated 10/27/2020 within 7 day(s). 1.  Patient will perform upper body dressing with independence. 2.  Patient will perform lower body dressing with supervision/set-up. 3.  Patient will perform functional activities in standing with G balance for 5 minutes with supervision/set-up. 4.  Patient will perform toilet transfers with modified independence. 5.  Patient will perform all aspects of toileting with modified independence. Prior Level of Function:  Patient reports he was independent with all ADLs without the use of AE/DME prior to admission. Patient reports having RW, Shower chair and BSC at home from previous surgery. Outcome: Progressing Towards Goal   OCCUPATIONAL THERAPY TREATMENT    Patient: Juany Smith (5 y.o. male)  Date: 10/28/2020  Diagnosis: Brain cancer ([facility] [C71.9]  Partial seizures ([facility] [R56.9]  Gait disturbance [R26.9]  Seizures ([facility] [R56.9]   Seizures ([address]       Precautions: Fall, Aspiration    Chart, occupational therapy assessment, plan of care, and goals were reviewed. ASSESSMENT:  Pt required encouragement to participate in OT. Pt is seen with PT to increase safety of the pt and staff during functional mobility and ADLs. Pt agreed to maneuver OOB for ADLs this am. Pt required SBA for bed mobility due to impulsivity and very minimal safety awareness. Pt was able to verbalize his limitations throughout the session, reporting decreased sensations in RUE and RLE, which limit his balance and coordination. Pt was able to verbalize understanding and return education for all safety precautions discussed throughout the session, however, did not follow any safety precautions, requiring Min/CGA for safety during all standing tasks to prevent fall and injury.  Pt was able to complete LB dressing with SBA, requiring Min A for standing grooming for standing balance and for sequencing. Progression toward goals:  [x]          Improving appropriately and progressing toward goals  []          Improving slowly and progressing toward goals  []          Not making progress toward goals and plan of care will be adjusted     PLAN:  Patient continues to benefit from skilled intervention to address the above impairments. Continue treatment per established plan of care. Discharge Recommendations:  Rehab vs Home Health with 24/7 Supervision for safety  Further Equipment Recommendations for Discharge:  bedside commode and shower chair     SUBJECTIVE:   Patient stated I know I have to pay attention to this arm, but I am having a hard time multitasking.     OBJECTIVE DATA SUMMARY:   Cognitive/Behavioral Status:  Neurologic State: Alert  Orientation Level: Oriented X4  Cognition: Follows commands, Impaired decision making, Decreased attention/concentration, Poor safety awareness  Safety/Judgement: Decreased awareness of environment, Decreased awareness of need for assistance, Decreased awareness of need for safety, Decreased insight into deficits, Fall prevention    Functional Mobility and Transfers for ADLs:   Bed Mobility:     Supine to Sit: Stand-by assistance  Sit to Supine: Stand-by assistance   Transfers:  Sit to Stand: Contact guard assistance  Stand to Sit: Contact guard assistance;Minimum assistance  Bed to Chair: Minimum assistance;Contact guard assistance;Assist x2(with HHA, CGA-SBA of 2nd person)    Toilet Transfer : Minimum assistance(simulated w/HHA)     Bathroom Mobility: Minimum assistance    Balance:  Sitting: Impaired  Sitting - Static: Good (unsupported)  Sitting - Dynamic: Fair (occasional)  Standing: Impaired  Standing - Static: Fair  Standing - Dynamic : Fair(Fair to Fair minus)    ADL Intervention:   Grooming  Grooming Assistance: Minimum assistance  Position Performed: Standing  Washing Hands: Minimum assistance  Upper Body Dressing Assistance  Shirt simulation with hospital gown: Minimum  assistance  Front Opened Shirt: Minimum assistance    Lower Body Dressing Assistance  Socks: Stand-by assistance  Position Performed: Seated edge of bed  Cognitive Retraining  Safety/Judgement: Decreased awareness of environment;Decreased awareness of need for assistance;Decreased awareness of need for safety;Decreased insight into deficits; Fall prevention  Pain:  Pain level pre-treatment: 0/10   Pain level post-treatment: 0/10    Activity Tolerance:    Fair  Please refer to the flowsheet for vital signs taken during this treatment. After treatment:   []  Patient left in no apparent distress sitting up in chair  [x]  Patient left in no apparent distress in bed  [x]  Call bell left within reach  [x]  Nursing notified  []  Caregiver present  [x]  Bed alarm activated    COMMUNICATION/EDUCATION:   [x] Role of Occupational Therapy in the acute care setting  [x] Home safety education was provided and the patient/caregiver indicated understanding. [x] Patient/family have participated as able in working towards goals and plan of care. [x] Patient/family agree to work toward stated goals and plan of care. [] Patient understands intent and goals of therapy, but is neutral about his/her participation. [] Patient is unable to participate in goal setting and plan of care.       Thank you for this referral.  Jonathan Dubin, MS, OTR/L  Time Calculation: 26 mins

## 2020-10-28 NOTE — PROGRESS NOTES
Problem: Mobility Impaired (Adult and Pediatric)  Goal: *Acute Goals and Plan of Care (Insert Text)  Description: Physical Therapy Goals  Initiated 10/27/2020 and to be accomplished within 7 day(s)  1. Patient will move from supine to sit and sit to supine , scoot up and down, and roll side to side in bed with modified independence. 2.  Patient will transfer from bed to chair and chair to bed with supervision/set-up using the least restrictive device. 3.  Patient will perform sit to stand with supervision/set-up. 4.  Patient will ambulate with supervision/set-up for 75 feet with the least restrictive device. 5.  Patient will ascend/descend 3 stairs with 1 handrail(s) with supervision/set-up. PLOF: Pt reports being ind PTA and active, lives with his wife in a one story home with 3-4 ZENIA, has DME like walker, shower chair, BSC but does not currently use      Outcome: Progressing Towards Goal     PHYSICAL THERAPY TREATMENT    Patient: Alia Hansen (89 y.o. male)  Date: 10/28/2020  Diagnosis: Brain cancer ([address] [C71.9]  Partial seizures ([address] [R56.9]  Gait disturbance [R26.9]  Seizures ([address] [R56.9]   Seizures ([address]       Precautions: Fall, Aspiration      ASSESSMENT:  Patient received supine in bed agreeable to therapy session following max encouragement. Educated patient on role of PT in acute setting and importance of mobility. Patient seen in conjunction with OT to maximize safety, participation, and functional mobility. He continues with impulsivity and decreased safety awareness. Pt refuses to use RW. Patient verbalizes deficits including right sided weakness and decreased sensation, although has poor compliance with verbal cues for safety with mobility. He requires CG/min x2 to ambulate multiple laps inside room with right HHA and max verbal cues for pacing. Patient with impaired coordination and ataxic gait. At end of session, patient left in bed with call bell within reach and bed alarm activated. Progression toward goals:  []      Improving appropriately and progressing toward goals  [x]      Improving slowly and progressing toward goals  []      Not making progress toward goals and plan of care will be adjusted     PLAN:  Patient continues to benefit from skilled intervention to address the above impairments. Continue treatment per established plan of care. Discharge Recommendations:  Rehab vs home health with 24h supervision for safety  Further Equipment Recommendations for Discharge:  shower chair, RW vs cane     SUBJECTIVE:   Patient stated You can leave that at the door.  [RW]    OBJECTIVE DATA SUMMARY:   Critical Behavior:  Neurologic State: Alert  Orientation Level: Oriented X4  Cognition: Follows commands, Impaired decision making, Decreased attention/concentration, Poor safety awareness  Safety/Judgement: Decreased awareness of environment, Decreased awareness of need for assistance, Decreased awareness of need for safety, Decreased insight into deficits, Fall prevention  Functional Mobility Training:  Bed Mobility:     Supine to Sit: Stand-by assistance  Sit to Supine: Stand-by assistance        Transfers:  Sit to Stand: Contact guard assistance  Stand to Sit: Contact guard assistance;Minimum assistance           Balance:  Sitting: Impaired  Sitting - Static: Good (unsupported)  Sitting - Dynamic: Fair (occasional)  Standing: Impaired  Standing - Static: Fair  Standing - Dynamic : Fair(/fair (-))         Ambulation/Gait Training:  Distance (ft): 60 Feet (ft)  Assistive Device: (HHA)  Ambulation - Level of Assistance: Contact guard assistance;Minimal assistance;Assist x2  Gait Abnormalities: Ataxic  Base of Support: Narrowed; Center of gravity altered  Speed/Eda: Fluctuations           Pain:  Pain level pre-treatment: 0/10  Pain level post-treatment: 0/10   Pain Intervention(s): Medication (see MAR);  Rest, Ice, Repositioning   Response to intervention: Nurse notified, See doc flow    Activity Tolerance:   good  Please refer to the flowsheet for vital signs taken during this treatment. After treatment:   [x] Patient left in no apparent distress sitting up in chair  [] Patient left in no apparent distress in bed  [x] Call bell left within reach  [] Nursing notified  [] Caregiver present  [x] Bed alarm activated  [] SCDs applied      COMMUNICATION/EDUCATION:   [x]         Role of Physical Therapy in the acute care setting. [x]         Fall prevention education was provided and the patient/caregiver indicated understanding. [x]         Patient/family have participated as able in working toward goals and plan of care. []         Patient/family agree to work toward stated goals and plan of care. []         Patient understands intent and goals of therapy, but is neutral about his/her participation.   []         Patient is unable to participate in stated goals/plan of care: ongoing with therapy staff.  []         Other:        Johanne Bartlett, PT   Time Calculation: 25 mins

## 2020-10-28 NOTE — ROUTINE PROCESS
Bedside and Verbal shift change report given to Jerry Garcia RN (oncoming nurse) by Alanna Montilla RN (offgoing nurse). Report included the following information SBAR, Kardex, Intake/Output, MAR and Recent Results.

## 2020-10-28 NOTE — PROGRESS NOTES
D/C orders received. No needs identified by . Chart reviewed. Pt will be transported home by family.  available as needed.     Erich Mcgarry, RN BSN  Care Manager  [phone]

## 2020-10-28 NOTE — PROGRESS NOTES
conducted a Follow up consultation and Spiritual Assessment for Haley Garcia, who is a 77 y.o.,male. The  provided the following Interventions:  Continued the relationship of care and support. Listened empathically as patient shared his life story. Offered prayer and assurance of continued prayer on patients behalf. Chart reviewed. The following outcomes were achieved:  Patient reviewed his means of coping, a deep and abiding lucila in [facility] Father. \" e  The patient expressed gratitude for 's visit. Assessment:  There are no further spiritual or Alevism issues which require Spiritual Care Services interventions at this time. Plan:  Chaplains will continue to follow and will provide pastoral care as needed or as requested.  recommends bedside caregivers page  on duty if patient shows signs of acute spiritual or emotional distress. [address] [place] [address].   Spiritual Care   [phone]

## 2020-10-28 NOTE — PROGRESS NOTES
Palliative Medicine      Palliative Medicine  Palliative Team member for follow up supportive visit with Mr. Kishor Ramirez at bedside. Pt is alert and awake, and able to recall our conversation from yesterday. Pt expressed that he was happy to be discharging to home. Provided pt with a blank Advance Medical Directive for completion after he has further conversation with his family. Briefly discussed goals of care/code status I know CPR would not be good for me but have to get all the family on the same page. Pt stated he would speak with his family. He is aware that his time is short. Thank you for the referral and allowing us to be involved with Mr. Vida dooley.     Torin Zhang BSN, RN, [facility]  Palliative Medicine Inpatient RN  Palliative [facility] Line: [phone]

## 2020-10-28 NOTE — PROGRESS NOTES
Pt belongings packed. pts wife called for transport. Pt's wife understands discharge instructions.  Pt to be picked up by wife at 2:15 pm

## 2020-10-28 NOTE — DISCHARGE SUMMARY
Discharge Summary      Patient: Eagle Mattson MRN: [id number]  CSN: [id number]    YOB: 1945  Age: 77 y.o. Sex: male    DOA: 10/26/2020 LOS:  LOS: 2 days   Discharge Date: 10/28/2020     Admission Diagnoses: Brain cancer ([address] [C71.9]  Partial seizures ([address] [R56.9]  Gait disturbance [R26.9]  Seizures ([address] [R56.9]    Discharge Diagnoses:    1. Recurrent seizures  2. Glioblastoma multiforme status post gross resection, chemo and radiation  3. Left-sided parietal mass  4. COPD without acute exacerbation  5. Hx PAD with grade 3 esophagitis  6. HLD  7. GERD  8. DJD      Discharge Condition: Stable    PHYSICAL EXAM  Visit Vitals  /79 (BP 1 Location: Right arm, BP Patient Position: Supine)   Pulse 80   Temp 98 °F (36.7 °C)   Resp 18   Ht 5' 9\" (1.753 m)   Wt 79.4 kg (175 lb 0.7 oz)   SpO2 97%   BMI 25.85 kg/m²     General:         Alert, cooperative, no acute distress, confused  HEENT:           NC, Atraumatic. Anicteric sclerae. Lungs:            CTA Bilaterally. No Wheezing/Rhonchi/Rales. Heart:              Regular  rhythm,  No murmur, No Rubs, No Gallops  Abdomen:      Soft, Non distended, Non tender.  +Bowel sounds, no HSM  Extremities:   No c/c/e  Psych:              Not anxious or agitated. Neurologic:     Alert and oriented [facility] Course:   Mr. Eagle Mattson is a 78 y/o male with a PMHx of glioblastoma multiforme status post resection, chemo and radiation, PUD with grade 3 esophagitis, COPD, HLD, GERD, and DJD who presented to the ED with complaints of recurrent seizures. Mr. Oneal Stockton reportedly had recent surgery and had been on Keppra since. While at home earlier in the day, he was seen to have shaking of his upper extremities and rigidity. He did not lose consciousness or control of his bladder or bowels but did have some residual R sided weakness and gait instability since his surgery.  While in the ED, he had another episode of shaking and rigidity and was given IV ativan with relief. Vitals and labs were reassuring. CT head was negative for acute findings but did show vasogenic edema and/or infiltrative tumor in the L cerebrum. The case was discussed with neurosurgery at [facility] and further recommendation were given. He was then started on IV decadron and his Keppra was increased. He was then admitted for recurrent seizures. Neurology and hematology/oncology were consulted and made further recommendations. An MRI brain was done and showed progression of the tumor/GBM along the L parietal cavity with increased vasogenic edema. Over the next couple days, Mr. Aleyda Dailey was continued on IV decadron and keppra. No further seizure activity was noticed. While alert and oriented at times, Mr. Aleyda Dailey had intermittent confusion to varying degrees. Palliative care was consulted and had goals of care discussions with his family. PT and OT evaluated him and made recommendations for rehab versus home health with 24 hour supervision. His case was discussed with his family who requested he be discharged home. On 10/28/20, Mr. Aleyda Dailey had remained hemodynamically stable. He had no further seizure activity and was cleared for discharge by neurology. Return precautions were discussed and he was given instructions and prescriptions for the medications as below. He was also instructed to follow up with his oncologist and neurosurgeon and was then discharged home with home health. Consults:   Glenna Neves MD   Neurology- Dr. Humaira Christie MD  Hematology/Oncology- Dr. Benny Lehman, [address] [address],        Significant Diagnostic Studies:     CT head without contrast 10/26/2020:  No significant interval change from 2 days prior. Vasogenic edema and/or infiltrative tumor throughout the left cerebral convexity which appears similar in extent to head CT from 2 days prior, but has advanced since prior MRI on September 8.     Soft tissue density within the region of vasogenic edema may represent residual or recurrent tumor.     Similar trace rightward midline shift.     No large territory acute infarct or acute bleed. MRI brain with and without contrast 10/26/2020:  Compared with outside MRI of 9/10/2020, there is notable progression of tumor/GBM along the anterior inferior margins of the left parietal resection cavity, with increased amount of surrounding vasogenic edema  -No acute hemorrhage or herniation        Procedures Performed: None        Discharge Medications:  Discharge Medication List as of 10/28/2020  1:40 PM      START taking these medications    Details   LORazepam (Ativan) 1 mg tablet Take 1 Tab by mouth every four (4) hours as needed (Seizure). Max Daily Amount: 6 mg., Print, Disp-15 Tab,R-0         CONTINUE these medications which have CHANGED    Details   !! levETIRAcetam (Keppra) 1,000 mg tablet Take 1 Tab by mouth two (2) times a day for 30 days. , Normal, Disp-60 Tab,R-0      !! levETIRAcetam (Keppra) 250 mg tablet Take 1 Tab by mouth two (2) times a day., Print, Disp-60 Tab,R-0       !! - Potential duplicate medications found. Please discuss with provider. CONTINUE these medications which have NOT CHANGED    Details   dexAMETHasone (Decadron) 4 mg tablet Take 4 mg by mouth two (2) times daily (with meals) for 30 days. , Normal, Disp-60 Tab,R-0         STOP taking these medications       tamsulosin (FLOMAX) 0.4 mg capsule Comments:   Reason for Stopping:                Activity: activity as tolerated    Diet: Regular Diet    Wound Care: None needed      Follow-up Information     Follow up With Specialties Details Why Jeanie Leos MD Oncology Schedule an appointment as soon as possible for a visit on 11/23/2020 @ 9:15am [facility] Jennifer Galaviz MD Neurosurgery Schedule an appointment as soon as possible for a visit on 1/21/2021 @ 2:30 [address] [facility]  [phone]      None    None ([phone]) Patient stated that they have no PCP             Minutes spent on discharge: >30 minutes spent coordinating this discharge (review instructions/follow-up, prescriptions, preparing report for sign off)          NITESH Dimas,    October 30, 2020         Disclaimer: Sections of this note are dictated using utilizing voice recognition software, which may have resulted in some phonetic based errors in grammar and contents. Even though attempts were made to correct all the mistakes, some may have been missed, and remained in the body of the document. If questions arise, please contact our department.

## 2020-10-28 NOTE — PROGRESS NOTES
conducted an initial consultation and Spiritual Assessment for Alma Westbrook, who is a 77 y.o.,male. Patient's Primary Language is: [place]. According to the patient's EMR Anabaptism Affiliation is: [employer]. The reason the Patient came to the hospital is:   Patient Active Problem List    Diagnosis Date Noted    Partial seizures ([facility] 10/26/2020    Gait disturbance 10/26/2020    Brain cancer ([facility] 10/26/2020    Seizures ([facility] 10/26/2020    Acute prostatitis 02/27/2018    Chronic neck pain 06/03/2016    Chronic back pain Dr Jovanni Mayes 11/12/2015    Arthritis, degenerative 11/12/2015    Hypovitaminosis D 07/28/2014    Recurrent kidney stones Dr Neeru Neal 05/10/2013    Hyperlipidemia 05/08/2013    GERD and gastritis 12/08/2011        The  provided the following Interventions:  Initiated a relationship of care and support. Explored issues of lucila, belief, spirituality and Judaism/ritual needs while hospitalized. Listened empathetically as patient shared his experience with the deaths of his wife and mother. Provided chaplaincy education. Offered prayer and assurance of continued prayers on patient's behalf. Chart reviewed. The following outcomes where achieved:  Patient shared limited information about both their medical narrative and spiritual beliefs.  confirmed Patient's Anabaptism Affiliation as [employer]  Patient processed feeling about current hospitalization. Patient expressed gratitude for 's visit. Assessment:  Patient does not have any known Judaism/cultural needs that will affect patient's preferences in health care. There are no known spiritual or Judaism issues which require intervention at this time. Plan:   was paged and promised to return and finish the visit. Ava Edouard  recommends bedside caregivers page the  on duty if patient shows signs of spiritual or emotional distress.     [address] TILA Quiroga.  [address]   [phone]

## 2020-10-28 NOTE — PROGRESS NOTES
HEMATOLOGY AND ONCOLOGY   PROGRESS NOTE      Patient: Pam Sharp   MRN: [id number]      YOB: 1945      Admit Date: 10/26/2020    Assessment:     # GBM: date of diagnosis February 2020. MGMT negative. IDH1/IDH2 not detected. 1p/19q not deleted. - Left-sided parietal mass. A 4 x 4.5 x 5 cm associated with seizures and altered mental status on presentation.  - MGMT methylation and molecular markers, pending.  - Status post craniotomy with gross resection ( [id number]). - Adjuvant chemoradiotherapy beginning April 2020, using standard dose Temozolomide 75 mg/m2 daily throughout six weeks of radiation finishing up 06/10/20.  - Postradiation baseline brain MRI, 07/10/20, showed 15 x 10 mm new nodular focus of enhancement at the anterior resection margin with corresponding elevated CBV and diffusion restriction consistent with recurrent tumor.  - Single-agent Temozolomide 150 mg/m2 daily on a 5/28 day cycle, started on 07/20/20 along with Optune TTF.  - Followup brain MRI, 09/08/20, suggestive of modest progression of residual tumor in left parietal convexity craniotomy resection margin, now measuring 1.9 x 1.3 cm, previously 1.4 x 1.1. # SEIZURE DISORDER:    Plan:     - Continue current care with neuro check and Tx of seizure as well as vasogenic edema. - Appreciate input by neurology. - Could decrease Decadron 4 mg Po to TID dose till he is followed by Dr. Stef Arenas next week, dose needs further tapering.   - Antiepileptics as per neurology. - Resume Optune ASAP, he has the device at home. - Seizure and falls precautions. - Pepcid while on steroids.   - DVT prophylaxis as per the primary team.     I discussed the plan with the patient and answered his questions. Venita Llamas MD  [facility] [phone]    Subjective:     He feels better and reports an improvement of his R side weakness and no more seizure like activity.  He talks with no slurred speech and denies any complaints this morning. He wants to go home. Objective:     Patient Vitals for the past 24 hrs:   BP Temp Pulse Resp SpO2   10/28/20 1151 123/79 98 °F (36.7 °C) 80 18 97 %   10/28/20 0800 111/79 98.4 °F (36.9 °C) 87 16 100 %   10/28/20 0431 120/87 97.6 °F (36.4 °C) 80 18 98 %   10/28/20 0015 117/71 97.6 °F (36.4 °C) 66 18    10/27/20 2002 127/83 98.3 °F (36.8 °C) 70 18 94 %   10/27/20 1615 (!) 148/85 97.9 °F (36.6 °C) 71 19 94 %         Intake/Output Summary (Last 24 hours) at 10/28/2020 1438  Last data filed at 10/28/2020 1010  Gross per 24 hour   Intake 240 ml   Output 800 ml   Net -560 ml       Temp (24hrs), Av °F (36.7 °C), Min:97.6 °F (36.4 °C), Max:98.4 °F (36.9 °C)       Review Of Systems:     Constitutional: negative for fevers, or sweats. + fatigue. Eyes: negative for visual disturbance, redness and icterus  Ears, Nose, Mouth, Throat, and Face: negative for tinnitus, epistaxis, sore mouth and hoarseness  Respiratory: negative for cough, sputum, hemoptysis, pleurisy/chest pain or wheezing. Cardiovascular: negative for chest pain, chest pressure/discomfort, palpitations, irregular heart beats, syncope, paroxysmal nocturnal dyspnea  Gastrointestinal: negative for reflux symptoms, change in bowel habits, melena, diarrhea, constipation and abdominal pain. He has nausea. Genitourinary:negative for dysuria, nocturia, urinary incontinence, hesitancy and hematuria  Integument: negative for rash, skin lesion(s) and pruritus  Hematologic/Lymphatic: negative for easy bruising, bleeding and lymphadenopathy  Musculoskeletal:negative for myalgias, arthralgias and bone pain  Neurological: + seizure R side. Right leg weakness. Physical Exam:     Constitutional: Alert, oriented, not in distress  Eyes: PERRLA, anicteric, no redness  HEENT:  no mucositis, no thrush. Respiratory: symmetrical expansion,  no wheezing. Cardiovascular: S1S2 positive.    Integument: no rash, no petechiae, no ecchymosis. Musculoskeletal: no edema, no cyanosis. Neurological: intact, symmetrical exam of UE. He has minimal weakness of R leg compared to left leg.     Lab:     Recent Results (from the past 24 hour(s))   GLUCOSE, POC    Collection Time: 10/27/20  4:17 PM   Result Value Ref Range    Glucose (POC) 118 (H) 70 - 110 mg/dL   GLUCOSE, POC    Collection Time: 10/27/20  9:00 PM   Result Value Ref Range    Glucose (POC) 122 (H) 70 - 110 mg/dL   GLUCOSE, POC    Collection Time: 10/28/20  7:41 AM   Result Value Ref Range    Glucose (POC) 122 (H) 70 - 110 mg/dL   GLUCOSE, POC    Collection Time: 10/28/20 12:12 PM   Result Value Ref Range    Glucose (POC) 166 (H) 70 - 110 mg/dL

## 2020-10-30 ENCOUNTER — APPOINTMENT (OUTPATIENT)
Dept: RADIATION THERAPY | Age: 75
End: 2020-10-30

## 2020-11-02 NOTE — PROCEDURES
[address] [place] [address] Dr SHARP    Name:  Sana Redding  MR#:   [id number]  :  1945  ACCOUNT #:  [de-identified]  DATE OF SERVICE:  10/27/2020    REFERRING PROVIDER:  EEG was ordered by Oumou Washington MD    EEG Number:  MV EEG . Inpatient recording. REASON FOR EEG:  For evaluation of seizure. EEG TECHNIQUE USED:  Standard 10-20 system with 18-channel recording and an EKG. Total duration of the recording was 31 minutes. Activation procedure used was photic stimulation. MEDICATIONS:  The patient's medications include Keppra. EEG REPORT:  The background consists of posterior dominant alpha rhythm at a frequency of 8 Hz more prominent on the left side. There are no obvious spikes or sharp wave discharges. There are intermittent left side temporoparietal delta range slowing. Photic stimulation did not induce any additional discharges. IMPRESSION:  This is an abnormal electroencephalogram with the presence of focal slowing from the left side. Otherwise, no epileptiform discharges. CLINICAL CORRELATION:  The focal slowing indicates the presence of a structural brain lesion from that part of the brain.       MD SUBHASH [id number]  D:  2020 13:21  T:  2020 2:40  JOB #:  [id number]

## 2020-11-05 ENCOUNTER — HOSPITAL ENCOUNTER (OUTPATIENT)
Dept: RADIATION THERAPY | Age: 75
Discharge: HOME | End: 2020-11-05
Payer: MEDICARE

## 2020-11-05 PROCEDURE — 99211 OFF/OP EST MAY X REQ PHY/QHP: CPT

## 2021-12-11 NOTE — ROUTINE PROCESS
Pt calling to check status of refills. Advised sent to pharmacy 11/18.   Check with pharmacy Requesting asst to [facility], but while in room witness a seizure which entail tremors of his right side. Lasted approx. 1 minute. Suction @ bedside and siderails up X 4.

## 2024-11-14 NOTE — PROGRESS NOTES
80 y.o. WHITE OR  male who presents for evaluation. He is here asking to have a letter dictated     He is a high level  and his work keeps asking him to do various jobs. Sometimes he has to travel overseas like last year to [place]. 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 He reports that these are very physically taxing and flares up his arthritis and spine issues and takes him out of commission for several days.   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 Maurice Dodge COPD 7/11    on CXR    DJD (degenerative joint disease)     c spine on mri 2004, t spine mri 10/13; saw Dr Abigail Liao; intol lyrica, topamax and cymbalta; no response to gabapentin    Epididymitis     recurrent Dr Brooke Casarez 10/14; also gb polyps, no stones    FHx: heart disease     GERD (gastroesophageal reflux disease)     h/o gastritis; s/p dilation 8/14 Dr Brett Bruce    Hyperlipidemia     calculated 10 year risk score 12.6% (2014)    Hypovitaminosis D 7/14    Microscopic hematuria     Pancreatitis     10/14 from binge alcohol drinking; on CT 10/14 [facility]    PUD (peptic ulcer disease) 8/14    antral ulcers, gastritis, gr 3 esophagitis, esoph ring s/p dilation, neg h pylori Dr Brett Bruce    Recurrent kidney stones     2006 Dr. Montiel Apo; 2015     Past Surgical History:   Procedure Laterality Date   [address]    thallium negative    HX CATARACT REMOVAL Notified patient's .   Bilateral 2012    HX COLONOSCOPY      Dr. Catrachito Wesley 2005 negative    HX HEMORRHOIDECTOMY  2005    Dr. Gee Severe HX ORTHOPAEDIC  2006    left knee surgery/meniscus/patella in [place], [place] workmans comp    HX UROLOGICAL  4/15    [facility], Dr. Zhang Cordoba, Cystoscopy, left retrograde ureteroscopy, holmium laser lithotripsy, double j catheter      Social History     Social History    Marital status:      Spouse name: N/A    Number of children: 10    Years of education: N/A     Occupational History     [employer]      Social History Main Topics    Smoking status: Former Smoker     Packs/day: 1.00     Quit date: 1/1/2011    Smokeless tobacco: Former User    Alcohol use Yes      Comment: socially    Drug use: No    Sexual activity: Not on file     Other Topics Concern    Not on file     Social History Narrative     Current Outpatient Prescriptions   Medication Sig    carisoprodol (SOMA) 350 mg tablet TAKE 1 TABLET BY MOUTH FOUR TIMES DAILY    HYDROcodone-acetaminophen (NORCO)  mg tablet Take 1 Tab by mouth every six (6) hours as needed for Pain. Max Daily Amount: 4 Tabs.  temazepam (RESTORIL) 30 mg capsule TAKE ONE CAPSULE BY MOUTH NIGHTLY AS NEEDED FOR SLEEP    rosuvastatin (CRESTOR) 5 mg tablet Take 1 Tab by mouth nightly. No current facility-administered medications for this visit.       Allergies   Allergen Reactions    Benadryl [Diphenhydramine Hcl] Other (comments)     Patient states he becomes hyper      Cymbalta [Duloxetine] Other (comments)    Flexeril [Cyclobenzaprine] Other (comments)     Felt bad      Gabapentin Swelling    Lipitor [Atorvastatin] Myalgia    Lyrica [Pregabalin] Other (comments)     Weight gain    Nexium [Esomeprazole Magnesium] Other (comments)     Dry eyes    Pamelor [Nortriptyline] Hives    Pravastatin Myalgia    Septra [Sulfamethoprim Ds] Other (comments)     Agitation      Sinequan [Doxepin] Other (comments)     Irritable      Topamax [Topiramate] Other (comments)     Possible kidney stones? Visit Vitals    /80    Pulse 80    Temp 98.1 °F (36.7 °C) (Oral)    Ht 5' 9\" (1.753 m)    Wt 189 lb (85.7 kg)    SpO2 96%    BMI 27.91 kg/m2   A&O x3  Affect is appropriate. Mood stable  No apparent distress  Anicteric, no JVD, adenopathy or thyromegaly. No carotid bruits or radiated murmur  Lungs clear to auscultation, no wheezes or rales  Heart showed regular rate and rhythm. No murmur, rubs, gallops  Abdomen soft nontender, no hepatosplenomegaly or masses. Extremities without edema. Pulses 1-2+ symmetrically    Assessment and plan:  1. General.  Letter dictated. Routine f/u a sprev scheduled      Above conditions discussed at length and patient vocalized understanding.   All questions answered to patient satifaction